# Patient Record
Sex: MALE | Race: WHITE | NOT HISPANIC OR LATINO | Employment: OTHER | URBAN - METROPOLITAN AREA
[De-identification: names, ages, dates, MRNs, and addresses within clinical notes are randomized per-mention and may not be internally consistent; named-entity substitution may affect disease eponyms.]

---

## 2017-01-03 ENCOUNTER — OFFICE VISIT (OUTPATIENT)
Dept: INTERNAL MEDICINE | Age: 67
End: 2017-01-03

## 2017-01-03 VITALS
HEART RATE: 62 BPM | TEMPERATURE: 98.1 F | WEIGHT: 186 LBS | HEIGHT: 72 IN | BODY MASS INDEX: 25.19 KG/M2 | DIASTOLIC BLOOD PRESSURE: 74 MMHG | SYSTOLIC BLOOD PRESSURE: 142 MMHG | OXYGEN SATURATION: 98 %

## 2017-01-03 DIAGNOSIS — I10 ESSENTIAL HYPERTENSION: ICD-10-CM

## 2017-01-03 PROCEDURE — 99213 OFFICE O/P EST LOW 20 MIN: CPT | Performed by: NURSE PRACTITIONER

## 2017-01-03 RX ORDER — AMLODIPINE AND VALSARTAN 5; 160 MG/1; MG/1
1 TABLET ORAL DAILY
Qty: 7 TABLET | Refills: 0 | Status: SHIPPED | OUTPATIENT
Start: 2017-01-03 | End: 2017-01-04 | Stop reason: SDUPTHER

## 2017-01-03 NOTE — MR AVS SNAPSHOT
Chapincito Nguyen   1/3/2017 12:40 PM   Office Visit    Dept Phone:  [phone]   Encounter #:  [id number]    Provider:  MONA Carroll   Department:  [facility]                Your Full Care Plan              Where to Get Your Medications      These medications were sent to [address] [place] - [address] [facility] AT [facility] - [phone]  - [phone] FX  [address] [facility], [place] [address]     Phone:  [phone]     amLODIPine-valsartan 5-160 MG per tablet            Your Updated Medication List          This list is accurate as of: 1/3/17  1:16 PM.  Always use your most recent med list.                amLODIPine-valsartan 5-160 MG per tablet   Commonly known as:  EXFORGE   Take 1 tablet by mouth Daily.       metoprolol succinate XL 50 MG 24 hr tablet   Commonly known as:  TOPROL XL   Take 1 tablet by mouth daily.       sildenafil 100 MG tablet   Commonly known as:  VIAGRA               You Were Diagnosed With        Codes Comments    Essential hypertension     ICD-10-CM: I10  ICD-9-CM: 401.9       Instructions     None    Patient Instructions History      Upcoming Appointments     Visit Type Date Time Department    OFFICE VISIT 1/3/2017 12:40 PM [facility]    OFFICE VISIT 2/13/2017  2:40 PM [facility]      Cr[employer]le Signup     Our records indicate that you have an active [employer] account.    You can view your After Visit Summary by going to [employer] and logging in with your Crackle username and password.  If you don't have a Crackle username and password but a parent or guardian has access to your record, the parent or guardian should login with their own Crackle username and password and access your record to view the After Visit Summary.    If you have questions, you can email [email or web address] or call [phone] to talk to our Crackle staff.  Remember,  "MyChart is NOT to be used for urgent needs.  For medical emergencies, dial 911.               Other Info from Your Visit           Your Appointments     Feb 13, 2017  2:40 PM EST   Office Visit with Ryne Boyce MD   [facility] (--)    [address] [place] [id number] [phone]           Arrive 15 minutes prior to appointment.              Allergies     Other        Reason for Visit     Hypertension Medication refills until est with Dr Boyce      Vital Signs     Blood Pressure Pulse Temperature Height Weight Oxygen Saturation    142/74 62 98.1 °F (36.7 °C) 72\" (182.9 cm) 186 lb (84.4 kg) 98%    Body Mass Index Smoking Status                25.23 kg/m2 Never Smoker          Problems and Diagnoses Noted     High blood pressure        "

## 2017-01-03 NOTE — PROGRESS NOTES
Chapincito Nguyen / 66 y.o. / male  01/03/2017      CC:  Main reason(s) for today's visit: Hypertension (Medication refills until est with Dr Boyce)      HPI:   Patient states that he was a former patient of Dr. Ramey here to establish care with Dr. Boyce on 02/13/2017. States that he needs a refill of his amlodipine until his mail in pharmacy can refill them. He currently has about three pills left. States that his B/P has remained stable with medication. States that he has not had any C/P, denies any chest pressure, no C/O H/A, heart palpitations, visual disturbances or dizziness. States that he has a H/O of aortic coarctation at age 36. States that he has been doing well since that time and sees his cardiologist once a year.      The following portions of the patient's history were reviewed and updated as appropriate: past medical history and past surgical history.    ASSESSMENT & PLAN:    Problem List Items Addressed This Visit        Cardiovascular and Mediastinum    Hypertension    Relevant Medications    metoprolol succinate XL (TOPROL XL) 50 MG 24 hr tablet    amLODIPine-valsartan (EXFORGE) 5-160 MG per tablet        No orders of the defined types were placed in this encounter.      · Summary/Discussion:     Hypertension: Patient with hypertension will be establishing care with Dr. Boyce on 2/13/20/17.  States that he had recently switched from commercial insurance to Medicare.  Patient also reports that his blood pressure has been stable with his medication.  Patient is here to discuss possible medication refills for amlodipine.  States that he currently has about 3 pills left, and will be receiving a 90 day supply from his mail order pharmacy that is afraid he will run out prior to receiving the medication.  I reviewed patient's labs as well as previous progress note and medication.  Patient will be given a refill of the medication.  Patient has no complaints and no abnormalities noted upon physical  examination at this time.  Patient was advised to continue to monitor his blood pressure and to contact the office with any chest pain, chest pressure, shortness of air, heart palpitations, dizziness or visual disturbances.  Otherwise patient was advised to return to the office for follow-up visit with Dr. Ryne Boyce as previously scheduled.    Follow-up: No Follow-up on file.     Future Appointments  Date Time Provider Department Center   2/13/2017 2:40 PM Ryne Boyce MD [facility] None     ____________________________________________________________________    REVIEW OF SYSTEMS    Review of Systems   Constitutional: Negative for activity change, appetite change, chills, diaphoresis, fatigue and fever.   HENT: Negative for congestion, dental problem, ear discharge, facial swelling, hearing loss, mouth sores, sore throat, trouble swallowing and voice change.    Eyes: Negative.    Respiratory: Negative for apnea, cough, choking, chest tightness, shortness of breath, wheezing and stridor.    Cardiovascular: Negative for chest pain, palpitations and leg swelling.   Gastrointestinal: Negative for abdominal distention, abdominal pain, anal bleeding, blood in stool, constipation, diarrhea, nausea, rectal pain and vomiting.   Endocrine: Negative for cold intolerance and heat intolerance.   Genitourinary: Negative for decreased urine volume, difficulty urinating, dysuria, enuresis, flank pain, frequency, genital sores, hematuria and urgency.   Musculoskeletal: Negative for arthralgias, back pain, gait problem, joint swelling, myalgias, neck pain and neck stiffness.   Skin: Negative for color change, pallor, rash and wound.   Allergic/Immunologic: Negative for environmental allergies, food allergies and immunocompromised state.   Neurological: Positive for dizziness (States it only lasts 15 seconds and has nausea and H/A. States that it occurs about once a month.  States that after he  has them he lies down and it  "resolves. ). Negative for tremors, seizures, syncope, facial asymmetry, speech difficulty, weakness, light-headedness, numbness and headaches.   Hematological: Negative for adenopathy. Does not bruise/bleed easily.   Psychiatric/Behavioral: Negative for agitation, confusion, decreased concentration, self-injury, sleep disturbance and suicidal ideas. The patient is not nervous/anxious and is not hyperactive.    All other systems reviewed and are negative.    Other: As noted per HPI    VITALS    Visit Vitals   • /74   • Pulse 62   • Temp 98.1 °F (36.7 °C)   • Ht 72\" (182.9 cm)   • Wt 186 lb (84.4 kg)   • SpO2 98%   • BMI 25.23 kg/m2     BP Readings from Last 3 Encounters:   01/03/17 142/74   04/21/16 144/76   09/22/15 130/70     Wt Readings from Last 3 Encounters:   01/03/17 186 lb (84.4 kg)   04/21/16 187 lb (84.8 kg)   09/22/15 181 lb (82.1 kg)      Body mass index is 25.23 kg/(m^2).    PHYSICAL EXAMINATION    Physical Exam   Constitutional: He is oriented to person, place, and time. He appears well-developed and well-nourished.   HENT:   Head: Normocephalic.   Eyes: Conjunctivae are normal. Pupils are equal, round, and reactive to light.   Neck: Normal range of motion.   Cardiovascular: Normal rate and regular rhythm.    Pulmonary/Chest: Effort normal and breath sounds normal.   Musculoskeletal: Normal range of motion.   Neurological: He is alert and oriented to person, place, and time.   Skin: Skin is warm and dry.   Psychiatric: He has a normal mood and affect. His behavior is normal.   Vitals reviewed.        REVIEWED DATA:    Labs:   Lab Results   Component Value Date     09/22/2015    K 4.1 09/22/2015    AST 18 09/22/2015    ALT 14 09/22/2015    BUN 15 09/22/2015    CREATININE 0.94 09/22/2015    EGFRIFNONA >60 09/22/2015    EGFRIFAFRI >60 09/22/2015       Lab Results   Component Value Date    GLU 96 09/22/2015       Lab Results   Component Value Date    LDL 85 09/22/2015    HDL 39 (L) 09/22/2015    " TRIG 138 09/22/2015       No results found for: TSH, FREET4     No results found for: WBC, HGB, PLT     Imaging:        Medical Tests:        Summary of old records / correspondence / consultant report:        Request outside records:          ALLERGIES:    Other    MEDICATIONS:  Current Outpatient Prescriptions   Medication Sig Dispense Refill   • amLODIPine-valsartan (EXFORGE) 5-160 MG per tablet Take 1 tablet by mouth Daily. 7 tablet 0   • metoprolol succinate XL (TOPROL XL) 50 MG 24 hr tablet Take 1 tablet by mouth daily. 30 tablet 5   • sildenafil (VIAGRA) 100 MG tablet Take  by mouth As Needed.       No current facility-administered medications for this visit.        [facility]    The following portions of the patient's history were reviewed and updated as appropriate: Allergies / Current Medications / Past Medical History / Surgical History / Social History / Family History    PROBLEM LIST:    Patient Active Problem List   Diagnosis   • Elevated prostate specific antigen (PSA)   • Depression   • Impotence of organic origin   • Hypertension   • Strain of lumbar region   • Mass of neck   • Chest wall mass   • Vertigo       PAST MEDICAL HX:    Past Medical History   Diagnosis Date   • Coarctation of aorta      Original in 1987, had a bleed in 1993 which was repaired and in 2006 where he had stents placed.   • Hypertension        PAST SURGICAL HX:    Past Surgical History   Procedure Laterality Date   • Aorta surgery  1987       SOCIAL HX:    Social History     Social History   • Marital status:      Spouse name: N/A   • Number of children: N/A   • Years of education: N/A     Occupational History   • Retired Speech Therapist      Social History Main Topics   • Smoking status: Never Smoker   • Smokeless tobacco: Never Used   • Alcohol use Yes      Comment: Occasionally   • Drug use: None   • Sexual activity: Not Asked     Other Topics Concern   • None     Social History Narrative       FAMILY HX:    Family  History   Problem Relation Age of Onset   • Breast cancer Mother       at age 40   • Breast cancer Maternal Aunt

## 2017-01-04 DIAGNOSIS — I10 ESSENTIAL HYPERTENSION: ICD-10-CM

## 2017-01-04 RX ORDER — METOPROLOL SUCCINATE 50 MG/1
50 TABLET, EXTENDED RELEASE ORAL DAILY
Qty: 90 TABLET | Refills: 0 | Status: SHIPPED | OUTPATIENT
Start: 2017-01-04 | End: 2017-03-03 | Stop reason: SDUPTHER

## 2017-01-04 RX ORDER — AMLODIPINE AND VALSARTAN 5; 160 MG/1; MG/1
1 TABLET ORAL DAILY
Qty: 90 TABLET | Refills: 0 | Status: SHIPPED | OUTPATIENT
Start: 2017-01-04 | End: 2017-03-03 | Stop reason: SDUPTHER

## 2017-01-08 DIAGNOSIS — I10 ESSENTIAL HYPERTENSION: ICD-10-CM

## 2017-01-09 RX ORDER — AMLODIPINE AND VALSARTAN 5; 160 MG/1; MG/1
TABLET ORAL
Qty: 7 TABLET | Refills: 0 | Status: SHIPPED | OUTPATIENT
Start: 2017-01-09 | End: 2017-02-14 | Stop reason: SDUPTHER

## 2017-02-14 ENCOUNTER — OFFICE VISIT (OUTPATIENT)
Dept: INTERNAL MEDICINE | Age: 67
End: 2017-02-14

## 2017-02-14 VITALS
HEIGHT: 72 IN | SYSTOLIC BLOOD PRESSURE: 126 MMHG | TEMPERATURE: 97.3 F | HEART RATE: 65 BPM | WEIGHT: 184.9 LBS | OXYGEN SATURATION: 94 % | DIASTOLIC BLOOD PRESSURE: 62 MMHG | BODY MASS INDEX: 25.04 KG/M2

## 2017-02-14 DIAGNOSIS — I10 ESSENTIAL HYPERTENSION: Primary | ICD-10-CM

## 2017-02-14 DIAGNOSIS — Z00.00 ROUTINE HEALTH MAINTENANCE: ICD-10-CM

## 2017-02-14 DIAGNOSIS — N52.9 IMPOTENCE OF ORGANIC ORIGIN: ICD-10-CM

## 2017-02-14 DIAGNOSIS — R97.20 ELEVATED PROSTATE SPECIFIC ANTIGEN (PSA): ICD-10-CM

## 2017-02-14 LAB
DEVELOPER EXPIRATION DATE: NORMAL
DEVELOPER LOT NUMBER: NORMAL
EXPIRATION DATE: NORMAL
FECAL OCCULT BLOOD SCREEN, POC: NEGATIVE
Lab: NORMAL
NEGATIVE CONTROL: NEGATIVE
POSITIVE CONTROL: POSITIVE

## 2017-02-14 PROCEDURE — 99214 OFFICE O/P EST MOD 30 MIN: CPT | Performed by: INTERNAL MEDICINE

## 2017-02-14 PROCEDURE — 82274 ASSAY TEST FOR BLOOD FECAL: CPT | Performed by: INTERNAL MEDICINE

## 2017-02-14 RX ORDER — ASPIRIN 81 MG/1
81 TABLET ORAL DAILY
COMMUNITY
End: 2022-12-27

## 2017-02-14 NOTE — PROGRESS NOTES
"Chapincito Nguyen / 66 y.o. / male  02/14/2017    VITALS    Visit Vitals   • /62   • Pulse 65   • Temp 97.3 °F (36.3 °C)   • Ht 72\" (182.9 cm)   • Wt 184 lb 14.4 oz (83.9 kg)   • SpO2 94%   • BMI 25.08 kg/m2     BP Readings from Last 3 Encounters:   02/14/17 126/62   01/03/17 142/74   04/21/16 144/76     Wt Readings from Last 3 Encounters:   02/14/17 184 lb 14.4 oz (83.9 kg)   01/03/17 186 lb (84.4 kg)   04/21/16 187 lb (84.8 kg)      Body mass index is 25.08 kg/(m^2).    CC:  Main reason(s) for today's visit: Establish Care (former robert pt. here to establish care); Hypertension; and Depression      HPI:     Patient presents today to become established in our practice.  His prior physician has closed his practice.    Hypertension: He is compliant with medication (amlodipine valsartan, metoprolol).  He is compliant with a low salt diet.  He is not regular with exercise.  We did discuss the utility and importance of exercise not so much for weight reduction in his particular situation, but for maintaining cardiovascular fitness as well as leg strength.    Impotence: Patient is compliant with medication,.  Patient does use the medication, and it is helpful, however he is concerned because of excessive cost associated with use of medication.  We did discuss the possibility of using a generic substitution an effort to save money.    Patient has a history of mildly elevated PSA which is ranged from a low of 4.2 to a high of 5.7 over the past 2 years.  Last PSA done May 2016.    Health maintenance: Last ophthalmology visit 2015.  Patient is aware of the need to see the ophthalmologist on an every 2-4 year basis.    Dentist: Every 6 months.    ______________________________________________________    ASSESSMENT & PLAN:    1. Essential hypertension    2. Impotence of organic origin    3. Elevated prostate specific antigen (PSA)    4. Routine health maintenance      Orders Placed This Encounter   Procedures   • Lipid " Panel   • Comprehensive Metabolic Panel   • PSA   • Urinalysis With / Microscopic If Indicated   • Hepatitis C Antibody   • POC Occult Blood Stool       Summary/Discussion:     · #1hypertension stable on current medical regimen.  Plan: Same meds, blood pressure check 6 months.  Check CMP, lipid when fasting, follow-up results by mail.  ·   · #2 evidence, recommend patient to investigate whether or not he can obtain Viagra on a generic basis which will save money.  ·   · #3 history of elevated PSA, we'll check PSA when lab is obtained and follow-up results as above.  ·   · #4 routine health maintenance: We will check hepatitis C antibody, urinalysis,      Return in about 6 months (around 8/14/2017) for Blood pressure check.    No future appointments.    ______________________________________________________    REVIEW OF SYSTEMS    Review of Systems   Constitutional: Negative.    HENT: Negative.    Eyes: Negative.    Respiratory: Negative.    Cardiovascular: Negative.    Gastrointestinal: Negative.    Endocrine: Negative.    Genitourinary: Negative.    Musculoskeletal:        Patient occasionally will have discomfort left chest and back along the incision of his two thoracotomy procedures   Skin: Negative.    Allergic/Immunologic: Negative for immunocompromised state.   Neurological: Negative.    Hematological: Negative.    Psychiatric/Behavioral: Negative.             PHYSICAL EXAMINATION    Physical Exam   Constitutional: He is oriented to person, place, and time. He appears well-developed and well-nourished. No distress.   HENT:   Head: Normocephalic and atraumatic.   Right Ear: Tympanic membrane, external ear and ear canal normal.   Left Ear: Tympanic membrane, external ear and ear canal normal.   Mouth/Throat: Uvula is midline, oropharynx is clear and moist and mucous membranes are normal.   Eyes: Conjunctivae and EOM are normal. Pupils are equal, round, and reactive to light.   Neck: Normal range of motion. Neck  supple. No JVD present. Carotid bruit is not present. No thyromegaly present.   Cardiovascular: Normal rate, regular rhythm, normal heart sounds and intact distal pulses.  Exam reveals no gallop and no friction rub.    No murmur heard.  Pulmonary/Chest: Effort normal and breath sounds normal. He has no wheezes. He has no rales.   Abdominal: Soft. Bowel sounds are normal. There is no hepatosplenomegaly. There is no tenderness. Hernia confirmed negative in the right inguinal area and confirmed negative in the left inguinal area.   Genitourinary: Rectum normal, prostate normal and penis normal. Prostate is not tender.   Musculoskeletal: Normal range of motion. He exhibits no edema or deformity.   Lymphadenopathy:     He has no cervical adenopathy.   Neurological: He is alert and oriented to person, place, and time. He has normal strength and normal reflexes. No cranial nerve deficit or sensory deficit. Coordination normal.   Skin: Skin is warm and dry. No rash noted.   Psychiatric: He has a normal mood and affect. His speech is normal and behavior is normal. Judgment and thought content normal. Cognition and memory are normal.   Nursing note and vitals reviewed.      REVIEWED DATA:    Labs:   Lab Results   Component Value Date     09/22/2015    K 4.1 09/22/2015    AST 18 09/22/2015    ALT 14 09/22/2015    BUN 15 09/22/2015    CREATININE 0.94 09/22/2015    EGFRIFNONA >60 09/22/2015    EGFRIFAFRI >60 09/22/2015       Lab Results   Component Value Date    GLU 96 09/22/2015       Lab Results   Component Value Date    LDL 85 09/22/2015    HDL 39 (L) 09/22/2015    TRIG 138 09/22/2015       No results found for: TSH, FREET4     No results found for: WBC, HGB, PLT     Imaging:        Medical Tests:        Summary of old records / correspondence / consultant report:        Request outside records:          ALLERGIES:    Review of patient's allergies indicates no known allergies.    MEDICATIONS:       Outpatient  Medications Prior to Visit   Medication Sig Dispense Refill   • amLODIPine-valsartan (EXFORGE) 5-160 MG per tablet Take 1 tablet by mouth Daily. 90 tablet 0   • metoprolol succinate XL (TOPROL XL) 50 MG 24 hr tablet Take 1 tablet by mouth Daily. 90 tablet 0   • sildenafil (VIAGRA) 100 MG tablet Take  by mouth As Needed.     • amLODIPine-valsartan (EXFORGE) 5-160 MG per tablet TAKE ONE TABLET BY MOUTH DAILY 7 tablet 0     No facility-administered medications prior to visit.        [facility]    The following portions of the patient's history were reviewed and updated as appropriate: Allergies / Current Medications / Past Medical History / Surgical History / Social History / Family History    PROBLEM LIST:    Patient Active Problem List   Diagnosis   • Elevated prostate specific antigen (PSA)   • Depression   • Impotence of organic origin   • Hypertension   • Strain of lumbar region   • Mass of neck   • History of cardiac anomaly   • History of aortic coarctation repair   • Aortic aneurysm   • Routine health maintenance       PAST MEDICAL HX:    Past Medical History   Diagnosis Date   • Coarctation of aorta      Original in , had a bleed in  which was repaired and in  where he had stents placed.   • Hypertension        PAST SURGICAL HX:    Past Surgical History   Procedure Laterality Date   • Aorta surgery         SOCIAL HX:    Social History     Social History   • Marital status:      Spouse name: N/A   • Number of children: 3   • Years of education: N/A     Occupational History   • Retired Speech Therapist      Social History Main Topics   • Smoking status: Never Smoker   • Smokeless tobacco: Never Used   • Alcohol use Yes      Comment: Occasionally   • Drug use: None   • Sexual activity: Not Asked     Other Topics Concern   • None     Social History Narrative       FAMILY HX:    Family History   Problem Relation Age of Onset   • Breast cancer Mother       at age 40   • Breast cancer Maternal  Aunt

## 2017-02-22 DIAGNOSIS — R97.20 ELEVATED PSA: Primary | ICD-10-CM

## 2017-02-22 LAB
ALBUMIN SERPL-MCNC: 4.4 G/DL (ref 3.5–5.2)
ALBUMIN/GLOB SERPL: 1.7 G/DL
ALP SERPL-CCNC: 77 U/L (ref 39–117)
ALT SERPL-CCNC: 15 U/L (ref 1–41)
APPEARANCE UR: CLEAR
AST SERPL-CCNC: 14 U/L (ref 1–40)
BILIRUB SERPL-MCNC: 0.6 MG/DL (ref 0.1–1.2)
BILIRUB UR QL STRIP: NEGATIVE
BUN SERPL-MCNC: 14 MG/DL (ref 8–23)
BUN/CREAT SERPL: 15.2 (ref 7–25)
CALCIUM SERPL-MCNC: 9 MG/DL (ref 8.6–10.5)
CHLORIDE SERPL-SCNC: 102 MMOL/L (ref 98–107)
CHOLEST SERPL-MCNC: 142 MG/DL (ref 0–200)
CO2 SERPL-SCNC: 27.5 MMOL/L (ref 22–29)
COLOR UR: YELLOW
CREAT SERPL-MCNC: 0.92 MG/DL (ref 0.76–1.27)
GLOBULIN SER CALC-MCNC: 2.6 GM/DL
GLUCOSE SERPL-MCNC: 93 MG/DL (ref 65–99)
GLUCOSE UR QL: NEGATIVE
HCV AB S/CO SERPL IA: <0.1 S/CO RATIO (ref 0–0.9)
HDLC SERPL-MCNC: 36 MG/DL (ref 40–60)
HGB UR QL STRIP: NEGATIVE
KETONES UR QL STRIP: NEGATIVE
LDLC SERPL CALC-MCNC: 83 MG/DL (ref 0–100)
LEUKOCYTE ESTERASE UR QL STRIP: NEGATIVE
NITRITE UR QL STRIP: NEGATIVE
PH UR STRIP: 6 [PH] (ref 5–8)
POTASSIUM SERPL-SCNC: 4.3 MMOL/L (ref 3.5–5.2)
PROT SERPL-MCNC: 7 G/DL (ref 6–8.5)
PROT UR QL STRIP: NEGATIVE
PSA SERPL-MCNC: 5.07 NG/ML (ref 0–4)
SODIUM SERPL-SCNC: 143 MMOL/L (ref 136–145)
SP GR UR: 1.02 (ref 1–1.03)
TRIGL SERPL-MCNC: 117 MG/DL (ref 0–150)
UROBILINOGEN UR STRIP-MCNC: NORMAL MG/DL
VLDLC SERPL CALC-MCNC: 23.4 MG/DL (ref 5–40)

## 2017-03-03 DIAGNOSIS — I10 ESSENTIAL HYPERTENSION: ICD-10-CM

## 2017-03-06 ENCOUNTER — TELEPHONE (OUTPATIENT)
Dept: INTERNAL MEDICINE | Age: 67
End: 2017-03-06

## 2017-03-06 RX ORDER — METOPROLOL SUCCINATE 50 MG/1
TABLET, EXTENDED RELEASE ORAL
Qty: 90 TABLET | Refills: 1 | Status: SHIPPED | OUTPATIENT
Start: 2017-03-06 | End: 2017-09-19 | Stop reason: SDUPTHER

## 2017-03-06 RX ORDER — AMLODIPINE AND VALSARTAN 5; 160 MG/1; MG/1
TABLET ORAL
Qty: 90 TABLET | Refills: 1 | Status: SHIPPED | OUTPATIENT
Start: 2017-03-06 | End: 2017-09-19 | Stop reason: SDUPTHER

## 2017-08-14 ENCOUNTER — OFFICE VISIT (OUTPATIENT)
Dept: INTERNAL MEDICINE | Age: 67
End: 2017-08-14

## 2017-08-14 VITALS
OXYGEN SATURATION: 96 % | BODY MASS INDEX: 24.38 KG/M2 | WEIGHT: 180 LBS | DIASTOLIC BLOOD PRESSURE: 60 MMHG | TEMPERATURE: 98.1 F | HEIGHT: 72 IN | SYSTOLIC BLOOD PRESSURE: 116 MMHG | HEART RATE: 60 BPM

## 2017-08-14 DIAGNOSIS — I10 ESSENTIAL HYPERTENSION: Primary | ICD-10-CM

## 2017-08-14 DIAGNOSIS — C61 PROSTATE CANCER (HCC): ICD-10-CM

## 2017-08-14 DIAGNOSIS — Z00.00 ROUTINE HEALTH MAINTENANCE: ICD-10-CM

## 2017-08-14 PROCEDURE — 99213 OFFICE O/P EST LOW 20 MIN: CPT | Performed by: INTERNAL MEDICINE

## 2017-08-14 NOTE — PROGRESS NOTES
"Chapincito Nguyen / 66 y.o. / male  08/14/2017  VITALS    /60  Pulse 60  Temp 98.1 °F (36.7 °C)  Ht 72\" (182.9 cm)  Wt 180 lb (81.6 kg)  SpO2 96%  BMI 24.41 kg/m2  BP Readings from Last 3 Encounters:   08/14/17 116/60   02/14/17 126/62   01/03/17 142/74     Wt Readings from Last 3 Encounters:   08/14/17 180 lb (81.6 kg)   02/14/17 184 lb 14.4 oz (83.9 kg)   01/03/17 186 lb (84.4 kg)      Body mass index is 24.41 kg/(m^2).    CC:  Main reason(s) for today's visit: Hypertension      HPI:     Patient was last seen by me during February of this year which time his blood pressure is well-controlled 126/62.  He presents today for follow-up.  Over this timeframe, his weight has dropped approximately 4 pounds.      Interval history: Because of an elevated PSA noted during that visit in February, patient was referred on to urology, biopsy proved positive for prostate cancer.  Cancer was only noted in one of 12 specimens obtained.  He was treated with radiation through July of this year.  Fortunately he displays no significant side effects of diarrhea, urinary incontinence, or worsening of his underlying ED.  The only major side effect noted was that of fatigue which is gradually improving with time.    Hypertension: He is compliant with medication, dietary salt restriction,.  He is exercising as tolerated because of the fatigue he does not regularly monitor blood pressure the outpatient setting.    Patient Care Team:  Ryne Boyce MD as PCP - General (Internal Medicine)  Chandan Brooke MD as PCP - Claims Attributed    ____________________________________________________________________    ASSESSMENT & PLAN:    Problem List Items Addressed This Visit        Unprioritized    Hypertension - Primary    Relevant Medications    metoprolol succinate XL (TOPROL-XL) 50 MG 24 hr tablet    amLODIPine-valsartan (EXFORGE) 5-160 MG per tablet        No orders of the defined types were placed in this " encounter.      Summary/Discussion:     · Number-one hypertension stable on current medical regimen.  Plan: Same meds, blood pressure check 6 months.  ·   · #2 prostate cancer status post radiation without complication, patient will be monitored by urology with a PSA value later this summer.  ·   · #3 routine health maintenance, schedule annual wellness visit 6 months, patient was advised to inquire about having a shingles vaccine done as well.      No Follow-up on file.    Future Appointments  Date Time Provider Department Center   8/14/2017 12:40 PM MD [facility] None       ______________________________________________________    REVIEW OF SYSTEMS    Review of Systems   Respiratory: Negative for chest tightness and shortness of breath.    Cardiovascular: Negative for chest pain and palpitations.   Neurological: Negative for dizziness, syncope, speech difficulty, weakness, light-headedness and headaches.         PHYSICAL EXAMINATION    Physical Exam   Constitutional: He is oriented to person, place, and time. He appears well-developed and well-nourished. No distress.   Cardiovascular: Normal rate, regular rhythm, normal heart sounds and intact distal pulses.  Exam reveals no gallop and no friction rub.    No murmur heard.  Pulmonary/Chest: Effort normal and breath sounds normal. He has no wheezes. He has no rales.   Musculoskeletal: He exhibits no edema.   Neurological: He is alert and oriented to person, place, and time.   Skin: Skin is warm and dry. No rash noted.   Psychiatric: He has a normal mood and affect. His behavior is normal. Judgment and thought content normal.   Nursing note and vitals reviewed.      REVIEWED DATA:    Labs:   Lab Results   Component Value Date     02/21/2017    K 4.3 02/21/2017    AST 14 02/21/2017    ALT 15 02/21/2017    BUN 14 02/21/2017    CREATININE 0.92 02/21/2017    CREATININE 0.94 09/22/2015    EGFRIFNONA 83 02/21/2017    EGFRIFAFRI 100 02/21/2017           Lab Results   Component Value Date    GLU 93 02/21/2017    GLU 96 09/22/2015       Lab Results   Component Value Date    LDL 83 02/21/2017    LDL 85 09/22/2015    HDL 36 (L) 02/21/2017    TRIG 117 02/21/2017       No results found for: TSH, FREET4     No results found for: WBC, HGB, PLT     Imaging:        Medical Tests:        Summary of old records / correspondence / consultant report:        Request outside records:        No Known Allergies    Current Outpatient Prescriptions on File Prior to Visit   Medication Sig Dispense Refill   • amLODIPine-valsartan (EXFORGE) 5-160 MG per tablet TAKE 1 TABLET EVERY DAY 90 tablet 1   • aspirin 81 MG EC tablet Take 81 mg by mouth Daily.     • metoprolol succinate XL (TOPROL-XL) 50 MG 24 hr tablet TAKE 1 TABLET EVERY DAY 90 tablet 1   • sildenafil (VIAGRA) 100 MG tablet Take  by mouth As Needed.       No current facility-administered medications on file prior to visit.        PFSH:     The following portions of the patient's history were reviewed and updated as appropriate: Allergies / Current Medications / Past Medical History / Surgical History / Social History / Family History    Patient Active Problem List   Diagnosis   • Depression   • Impotence of organic origin   • Hypertension   • Mass of neck   • History of cardiac anomaly   • History of aortic coarctation repair   • Aortic aneurysm   • Routine health maintenance   • Prostate cancer       Past Medical History:   Diagnosis Date   • BPH (benign prostatic hypertrophy)    • Cancer 4/17    completed radiation therapy (43 sessions) on July 25, 2017   • Coarctation of aorta     Original in 1987, had a bleed in 1993 which was repaired and in 2006 where he had stents placed.   • Depression    • Hypertension    • Radiation therapy complication    • Strain of lumbar region        Past Surgical History:   Procedure Laterality Date   • AORTA SURGERY  1987       Social History     Social History   • Marital status:       Spouse name: N/A   • Number of children: 3   • Years of education: N/A     Occupational History   • Retired Speech Therapist      Social History Main Topics   • Smoking status: Never Smoker   • Smokeless tobacco: Never Used   • Alcohol use Yes     1 - 2 Glasses of wine, 1 - 2 Cans of beer per week      Comment: 1-2 per week   • Drug use: No   • Sexual activity: Yes     Partners: Female     Birth control/ protection: Surgical      Comment: vasectomy in      Other Topics Concern   • None     Social History Narrative       Family History   Problem Relation Age of Onset   • Breast cancer Mother       at age 40   • Breast cancer Maternal Aunt          **Leonidas Disclaimer:   Much of this encounter note is an electronic transcription/translation of spoken language to printed text. The electronic translation of spoken language may permit erroneous, or at times, nonsensical words or phrases to be inadvertently transcribed. Although I have reviewed the note for such errors, some may still exist.

## 2017-09-18 ENCOUNTER — TELEPHONE (OUTPATIENT)
Dept: INTERNAL MEDICINE | Age: 67
End: 2017-09-18

## 2017-09-18 DIAGNOSIS — I10 ESSENTIAL HYPERTENSION: ICD-10-CM

## 2017-09-18 RX ORDER — AMLODIPINE AND VALSARTAN 5; 160 MG/1; MG/1
TABLET ORAL
Qty: 90 TABLET | Refills: 1 | Status: CANCELLED | OUTPATIENT
Start: 2017-09-18

## 2017-09-18 RX ORDER — METOPROLOL SUCCINATE 50 MG/1
TABLET, EXTENDED RELEASE ORAL
Qty: 90 TABLET | Refills: 1 | Status: CANCELLED | OUTPATIENT
Start: 2017-09-18

## 2017-09-18 NOTE — TELEPHONE ENCOUNTER
Pt called stating he had spoken with his [facility] mail order pharmacy requesting refill on his Metoprolol Succ XL 50 mg and Amlodipine-valsartan 5-160 mg, 90 day supply, to be sent to our office but he hasn't heard from [facility].  Pt requesting refills.  Pt's # [phone]  Thanks SP

## 2017-09-19 RX ORDER — AMLODIPINE AND VALSARTAN 5; 160 MG/1; MG/1
1 TABLET ORAL DAILY
Qty: 90 TABLET | Refills: 1 | Status: SHIPPED | OUTPATIENT
Start: 2017-09-19 | End: 2018-02-19 | Stop reason: SDUPTHER

## 2017-09-19 RX ORDER — METOPROLOL SUCCINATE 50 MG/1
50 TABLET, EXTENDED RELEASE ORAL DAILY
Qty: 90 TABLET | Refills: 1 | Status: SHIPPED | OUTPATIENT
Start: 2017-09-19 | End: 2018-02-19 | Stop reason: SDUPTHER

## 2017-09-22 ENCOUNTER — TELEPHONE (OUTPATIENT)
Dept: INTERNAL MEDICINE | Age: 67
End: 2017-09-22

## 2018-02-16 ENCOUNTER — OFFICE VISIT (OUTPATIENT)
Dept: INTERNAL MEDICINE | Age: 68
End: 2018-02-16

## 2018-02-16 VITALS
SYSTOLIC BLOOD PRESSURE: 130 MMHG | DIASTOLIC BLOOD PRESSURE: 78 MMHG | BODY MASS INDEX: 24.16 KG/M2 | WEIGHT: 178.4 LBS | HEART RATE: 67 BPM | OXYGEN SATURATION: 98 % | TEMPERATURE: 97.9 F | HEIGHT: 72 IN

## 2018-02-16 DIAGNOSIS — Z23 ENCOUNTER FOR IMMUNIZATION: ICD-10-CM

## 2018-02-16 DIAGNOSIS — Z00.00 MEDICARE ANNUAL WELLNESS VISIT, SUBSEQUENT: Primary | ICD-10-CM

## 2018-02-16 DIAGNOSIS — Z20.828 EXPOSURE TO INFLUENZA: ICD-10-CM

## 2018-02-16 DIAGNOSIS — I10 ESSENTIAL HYPERTENSION: ICD-10-CM

## 2018-02-16 PROCEDURE — G0009 ADMIN PNEUMOCOCCAL VACCINE: HCPCS | Performed by: INTERNAL MEDICINE

## 2018-02-16 PROCEDURE — G0439 PPPS, SUBSEQ VISIT: HCPCS | Performed by: INTERNAL MEDICINE

## 2018-02-16 PROCEDURE — 90670 PCV13 VACCINE IM: CPT | Performed by: INTERNAL MEDICINE

## 2018-02-16 PROCEDURE — 99213 OFFICE O/P EST LOW 20 MIN: CPT | Performed by: INTERNAL MEDICINE

## 2018-02-16 RX ORDER — OSELTAMIVIR PHOSPHATE 75 MG/1
75 CAPSULE ORAL 2 TIMES DAILY
Qty: 10 CAPSULE | Refills: 0 | Status: SHIPPED | OUTPATIENT
Start: 2018-02-16 | End: 2018-02-16 | Stop reason: SDUPTHER

## 2018-02-16 RX ORDER — OSELTAMIVIR PHOSPHATE 75 MG/1
75 CAPSULE ORAL 2 TIMES DAILY
Qty: 10 CAPSULE | Refills: 0 | Status: SHIPPED | OUTPATIENT
Start: 2018-02-16 | End: 2018-08-10

## 2018-02-16 NOTE — PROGRESS NOTES
QUICK REFERENCE INFORMATION:  The ABCs of the Annual Wellness Visit    Subsequent Medicare Wellness Visit    HEALTH RISK ASSESSMENT    1950    Recent Hospitalizations:  No hospitalization(s) within the last year..        Current Medical Providers:  Patient Care Team:  Ryne Boyce MD as PCP - General (Internal Medicine)  Chandan Brooke MD as PCP - Claims Attributed        Smoking Status:  History   Smoking Status   • Never Smoker   Smokeless Tobacco   • Never Used       Alcohol Consumption:  History   Alcohol Use   • Yes   • 1 - 2 Glasses of wine, 1 - 2 Cans of beer per week     Comment: 1-2 per week       Depression Screen:   PHQ-2/PHQ-9 Depression Screening 2/16/2018   Little interest or pleasure in doing things 0   Feeling down, depressed, or hopeless 0   Total Score 0       Health Habits and Functional and Cognitive Screening:  Functional & Cognitive Status 2/16/2018   Do you have difficulty preparing food and eating? No   Do you have difficulty bathing yourself, getting dressed or grooming yourself? No   Do you have difficulty using the toilet? No   Do you have difficulty moving around from place to place? No   Do you have trouble with steps or getting out of a bed or a chair? No   Do you need help using the phone?  No   Are you deaf or do you have serious difficulty hearing?  No   Do you need help with transportation? No   Do you need help shopping? No   Do you need help preparing meals?  No   Do you need help with housework?  No   Do you need help with laundry? No   Do you need help managing money? No   Have you felt unusual stress, anger or loneliness in the last month? Yes   Who do you live with? Spouse   If you need help, do you have trouble finding someone available to you? No   Have you been bothered in the last four weeks by sexual problems? No   Do you have difficulty concentrating, remembering or making decisions? No           Does the patient have evidence of cognitive impairment?  Yes    Aspirin use counseling: Taking ASA appropriately as indicated      Recent Lab Results:  CMP:  Lab Results   Component Value Date    GLU 93 02/21/2017    BUN 14 02/21/2017    CREATININE 0.92 02/21/2017    EGFRIFNONA 83 02/21/2017    EGFRIFAFRI 100 02/21/2017    BCR 15.2 02/21/2017     02/21/2017    K 4.3 02/21/2017    CO2 27.5 02/21/2017    CALCIUM 9.0 02/21/2017    PROTENTOTREF 7.0 02/21/2017    ALBUMIN 4.40 02/21/2017    LABGLOBREF 2.6 02/21/2017    LABIL2 1.7 02/21/2017    BILITOT 0.6 02/21/2017    ALKPHOS 77 02/21/2017    AST 14 02/21/2017    ALT 15 02/21/2017     Lipid Panel:  Lab Results   Component Value Date    TRIG 117 02/21/2017    HDL 36 (L) 02/21/2017    VLDL 23.4 02/21/2017     HbA1c:       Visual Acuity:  No exam data present    Age-appropriate Screening Schedule:  Refer to the list below for future screening recommendations based on patient's age, sex and/or medical conditions. Orders for these recommended tests are listed in the plan section. The patient has been provided with a written plan.    Health Maintenance   Topic Date Due   • ZOSTER VACCINE  04/21/2016   • PNEUMOCOCCAL VACCINES (65+ LOW/MEDIUM RISK) (2 of 2 - PPSV23) 02/14/2018   • COLONOSCOPY  04/21/2024   • TDAP/TD VACCINES (2 - Td) 09/22/2025   • INFLUENZA VACCINE  Addressed        Subjective   History of Present Illness    Chapincito Nguyen is a 67 y.o. male who presents for an Subsequent Wellness Visit.    The following portions of the patient's history were reviewed and updated as appropriate: allergies, current medications, past family history, past medical history, past social history, past surgical history and problem list.    Outpatient Medications Prior to Visit   Medication Sig Dispense Refill   • amLODIPine-valsartan (EXFORGE) 5-160 MG per tablet Take 1 tablet by mouth Daily. 90 tablet 1   • aspirin 81 MG EC tablet Take 81 mg by mouth Daily.     • metoprolol succinate XL (TOPROL-XL) 50 MG 24 hr tablet Take 1 tablet by  "mouth Daily. 90 tablet 1   • sildenafil (VIAGRA) 100 MG tablet Take  by mouth As Needed.       No facility-administered medications prior to visit.        Patient Active Problem List   Diagnosis   • Depression   • Impotence of organic origin   • Hypertension   • Hx of bicuspid aortic valve   • H/O aortic coarctation repair   • Aortic aneurysm, including pseudoaneurysm   • Routine health maintenance   • Prostate cancer       Advance Care Planning:  has an advance directive - a copy has been provided and is in file    Identification of Risk Factors:  Risk factors include: inactivity.    Review of Systems    Compared to one year ago, the patient feels his physical health is the same.  Compared to one year ago, the patient feels his mental health is the same.    Objective     Physical Exam    Vitals:    02/16/18 0949   BP: 130/78   Pulse: 67   Temp: 97.9 °F (36.6 °C)   SpO2: 98%   Weight: 80.9 kg (178 lb 6.4 oz)   Height: 182.9 cm (72.01\")   PainSc: 0-No pain       Body mass index is 24.19 kg/(m^2).  Discussed the patient's BMI with him. BMI is within normal parameters. No follow-up required.    Assessment/Plan   Patient Self-Management and Personalized Health Advice  The patient has been provided with information about: None needed and preventive services including:   · Diabetes screening, see lab orders, Counseling regarding the new shingles vaccine has been provided..    Visit Diagnoses:    ICD-10-CM ICD-9-CM   1. Medicare annual wellness visit, subsequent Z00.00 V70.0   2. Essential hypertension I10 401.9   3. Encounter for immunization Z23 V03.89   4. Exposure to influenza Z20.828 V01.79       Orders Placed This Encounter   Procedures   • Pneumococcal Conjugate Vaccine 13-Valent All       Outpatient Encounter Prescriptions as of 2/16/2018   Medication Sig Dispense Refill   • amLODIPine-valsartan (EXFORGE) 5-160 MG per tablet Take 1 tablet by mouth Daily. 90 tablet 1   • aspirin 81 MG EC tablet Take 81 mg by mouth " Daily.     • metoprolol succinate XL (TOPROL-XL) 50 MG 24 hr tablet Take 1 tablet by mouth Daily. 90 tablet 1   • oseltamivir (TAMIFLU) 75 MG capsule Take 1 capsule by mouth 2 (Two) Times a Day. 10 capsule 0   • sildenafil (VIAGRA) 100 MG tablet Take  by mouth As Needed.       No facility-administered encounter medications on file as of 2/16/2018.        Reviewed use of high risk medication in the elderly: yes  Reviewed for potential of harmful drug interactions in the elderly: yes    Follow Up:  1 YEAR    An After Visit Summary and PPPS with all of these plans were given to the patient.

## 2018-02-16 NOTE — PATIENT INSTRUCTIONS
Medicare Wellness  Personal Prevention Plan of Service     Date of Office Visit:  2018  Encounter Provider:  Ryne Boyce MD  Place of Service:  [facility]  Patient Name: Chapincito Nguyen  :  1950    As part of the Medicare Wellness portion of your visit today, we are providing you with this personalized preventive plan of services (PPPS). This plan is based upon recommendations of the [place] Preventive Services Task Force (USPSTF) and the Advisory Committee on Immunization Practices (ACIP).    This lists the preventive care services that should be considered, and provides dates of when you are due. Items listed as completed are up-to-date and do not require any further intervention.    Health Maintenance   Topic Date Due   • ZOSTER VACCINE  2016   • PNEUMOCOCCAL VACCINES (65+ LOW/MEDIUM RISK) (2 of 2 - PPSV23) 2018   • MEDICARE ANNUAL WELLNESS  2019   • COLONOSCOPY  2024   • TDAP/TD VACCINES (2 - Td) 2025   • HEPATITIS C SCREENING  Completed   • INFLUENZA VACCINE  Addressed       Orders Placed This Encounter   Procedures   • Pneumococcal Conjugate Vaccine 13-Valent All   • Comprehensive Metabolic Panel   • Lipid Panel   • CBC & Differential     Order Specific Question:   Manual Differential     Answer:   No       No Follow-up on file.

## 2018-02-19 ENCOUNTER — TELEPHONE (OUTPATIENT)
Dept: INTERNAL MEDICINE | Age: 68
End: 2018-02-19

## 2018-02-19 DIAGNOSIS — I10 ESSENTIAL HYPERTENSION: ICD-10-CM

## 2018-02-19 RX ORDER — AMLODIPINE AND VALSARTAN 5; 160 MG/1; MG/1
1 TABLET ORAL DAILY
Qty: 90 TABLET | Refills: 1 | Status: SHIPPED | OUTPATIENT
Start: 2018-02-19 | End: 2018-08-10 | Stop reason: SDUPTHER

## 2018-02-19 RX ORDER — METOPROLOL SUCCINATE 50 MG/1
50 TABLET, EXTENDED RELEASE ORAL DAILY
Qty: 90 TABLET | Refills: 1 | Status: SHIPPED | OUTPATIENT
Start: 2018-02-19 | End: 2018-08-10 | Stop reason: SDUPTHER

## 2018-02-19 NOTE — TELEPHONE ENCOUNTER
[facility] - [place], [place] - [address] - [phone] [facility] [phone] FX    amLODIPine-valsartan (EXFORGE) 5-160 MG per tablet  metoprolol succinate XL (TOPROL-XL) 50 MG 24 hr tablet     These meds were to be sent to the above pharmacy on Friday and they were not received at the pharmacy. Pt needs these sent.    Thank you

## 2018-02-20 LAB
ALBUMIN SERPL-MCNC: 4.4 G/DL (ref 3.5–5.2)
ALBUMIN/GLOB SERPL: 1.6 G/DL
ALP SERPL-CCNC: 98 U/L (ref 39–117)
ALT SERPL-CCNC: 12 U/L (ref 1–41)
AST SERPL-CCNC: 15 U/L (ref 1–40)
BASOPHILS # BLD AUTO: 0.02 10*3/MM3 (ref 0–0.2)
BASOPHILS NFR BLD AUTO: 0.4 % (ref 0–1.5)
BILIRUB SERPL-MCNC: 0.7 MG/DL (ref 0.1–1.2)
BUN SERPL-MCNC: 14 MG/DL (ref 8–23)
BUN/CREAT SERPL: 15.2 (ref 7–25)
CALCIUM SERPL-MCNC: 9.3 MG/DL (ref 8.6–10.5)
CHLORIDE SERPL-SCNC: 101 MMOL/L (ref 98–107)
CHOLEST SERPL-MCNC: 145 MG/DL (ref 0–200)
CO2 SERPL-SCNC: 28.4 MMOL/L (ref 22–29)
CREAT SERPL-MCNC: 0.92 MG/DL (ref 0.76–1.27)
EOSINOPHIL # BLD AUTO: 0.13 10*3/MM3 (ref 0–0.7)
EOSINOPHIL NFR BLD AUTO: 2.4 % (ref 0.3–6.2)
ERYTHROCYTE [DISTWIDTH] IN BLOOD BY AUTOMATED COUNT: 12.8 % (ref 11.5–14.5)
GFR SERPLBLD CREATININE-BSD FMLA CKD-EPI: 100 ML/MIN/1.73
GFR SERPLBLD CREATININE-BSD FMLA CKD-EPI: 82 ML/MIN/1.73
GLOBULIN SER CALC-MCNC: 2.7 GM/DL
GLUCOSE SERPL-MCNC: 92 MG/DL (ref 65–99)
HCT VFR BLD AUTO: 45.2 % (ref 40.4–52.2)
HDLC SERPL-MCNC: 37 MG/DL (ref 40–60)
HGB BLD-MCNC: 14.8 G/DL (ref 13.7–17.6)
IMM GRANULOCYTES # BLD: 0 10*3/MM3 (ref 0–0.03)
IMM GRANULOCYTES NFR BLD: 0 % (ref 0–0.5)
LDLC SERPL CALC-MCNC: 85 MG/DL (ref 0–100)
LYMPHOCYTES # BLD AUTO: 0.78 10*3/MM3 (ref 0.9–4.8)
LYMPHOCYTES NFR BLD AUTO: 14.3 % (ref 19.6–45.3)
MCH RBC QN AUTO: 30.7 PG (ref 27–32.7)
MCHC RBC AUTO-ENTMCNC: 32.7 G/DL (ref 32.6–36.4)
MCV RBC AUTO: 93.8 FL (ref 79.8–96.2)
MONOCYTES # BLD AUTO: 0.64 10*3/MM3 (ref 0.2–1.2)
MONOCYTES NFR BLD AUTO: 11.7 % (ref 5–12)
NEUTROPHILS # BLD AUTO: 3.89 10*3/MM3 (ref 1.9–8.1)
NEUTROPHILS NFR BLD AUTO: 71.2 % (ref 42.7–76)
PLATELET # BLD AUTO: 240 10*3/MM3 (ref 140–500)
POTASSIUM SERPL-SCNC: 4.6 MMOL/L (ref 3.5–5.2)
PROT SERPL-MCNC: 7.1 G/DL (ref 6–8.5)
RBC # BLD AUTO: 4.82 10*6/MM3 (ref 4.6–6)
SODIUM SERPL-SCNC: 141 MMOL/L (ref 136–145)
TRIGL SERPL-MCNC: 115 MG/DL (ref 0–150)
VLDLC SERPL CALC-MCNC: 23 MG/DL (ref 5–40)
WBC # BLD AUTO: 5.46 10*3/MM3 (ref 4.5–10.7)

## 2018-02-20 NOTE — PROGRESS NOTES
All of the enclosed lab results are acceptable. None of  the the minimally abnormal values  are of any clinical significance.  Exercise would improve the HDL. Please repeat the labs in one year.

## 2018-08-10 ENCOUNTER — OFFICE VISIT (OUTPATIENT)
Dept: INTERNAL MEDICINE | Age: 68
End: 2018-08-10

## 2018-08-10 VITALS
WEIGHT: 178.4 LBS | HEIGHT: 72 IN | BODY MASS INDEX: 24.16 KG/M2 | SYSTOLIC BLOOD PRESSURE: 106 MMHG | TEMPERATURE: 97.9 F | DIASTOLIC BLOOD PRESSURE: 68 MMHG | OXYGEN SATURATION: 98 % | HEART RATE: 56 BPM

## 2018-08-10 DIAGNOSIS — I10 ESSENTIAL HYPERTENSION: ICD-10-CM

## 2018-08-10 DIAGNOSIS — G44.319 ACUTE POST-TRAUMATIC HEADACHE, NOT INTRACTABLE: ICD-10-CM

## 2018-08-10 DIAGNOSIS — I10 ESSENTIAL HYPERTENSION: Primary | ICD-10-CM

## 2018-08-10 PROCEDURE — 99213 OFFICE O/P EST LOW 20 MIN: CPT | Performed by: INTERNAL MEDICINE

## 2018-08-10 RX ORDER — AMLODIPINE AND VALSARTAN 5; 160 MG/1; MG/1
1 TABLET ORAL DAILY
Qty: 90 TABLET | Refills: 1 | Status: SHIPPED | OUTPATIENT
Start: 2018-08-10 | End: 2019-01-16 | Stop reason: SDUPTHER

## 2018-08-10 RX ORDER — METOPROLOL SUCCINATE 50 MG/1
25 TABLET, EXTENDED RELEASE ORAL DAILY
Qty: 90 TABLET | Refills: 1 | COMMUNITY
Start: 2018-08-10 | End: 2018-08-10 | Stop reason: SDUPTHER

## 2018-08-10 RX ORDER — METOPROLOL SUCCINATE 25 MG/1
25 TABLET, EXTENDED RELEASE ORAL DAILY
Qty: 90 TABLET | Refills: 1 | Status: SHIPPED | OUTPATIENT
Start: 2018-08-10 | End: 2019-01-16 | Stop reason: SDUPTHER

## 2018-08-10 NOTE — PROGRESS NOTES
"[facility] INTERNAL MEDICINE  MD Chapincito MURPHY Patrick / 67 y.o. / male  08/10/2018      ASSESSMENT & PLAN:    Problem List Items Addressed This Visit        Unprioritized    Hypertension - Primary    Relevant Medications    amLODIPine-valsartan (EXFORGE) 5-160 MG per tablet    metoprolol succinate XL (TOPROL-XL) 50 MG 24 hr tablet      Other Visit Diagnoses     Acute post-traumatic headache, not intractable        Relevant Medications    metoprolol succinate XL (TOPROL-XL) 50 MG 24 hr tablet        No orders of the defined types were placed in this encounter.      Summary/Discussion:    Number-one hypertension overly strict control on current medication.  Recommend the patient reduce the metoprolol dosage to 25 mg per day, follow-up blood pressure check 6 months.    #2 headache posttraumatic resolved at this time.  There is no localizing finding on physical exam.  I do not believe that a CT scan is indicated at this time.  Reassurance, patient will let me know if symptoms were to recur      Return in about 6 months (around 2/10/2019) for Blood pressure check.    ____________________________________________________________________    VITALS    Visit Vitals  /68   Pulse 56   Temp 97.9 °F (36.6 °C)   Ht 182.9 cm (72.01\")   Wt 80.9 kg (178 lb 6.4 oz)   SpO2 98%   BMI 24.19 kg/m²       BP Readings from Last 3 Encounters:   08/10/18 106/68   02/16/18 130/78   08/14/17 116/60     Wt Readings from Last 3 Encounters:   08/10/18 80.9 kg (178 lb 6.4 oz)   02/16/18 80.9 kg (178 lb 6.4 oz)   08/14/17 81.6 kg (180 lb)      Body mass index is 24.19 kg/m².    CC:  Main reason(s) for today's visit: Hypertension and Headache      HPI:     Patient presents this afternoon for follow-up of hypertension.  When last seen by us, blood pressure is well-controlled 130/78.  Today he is compliant with medication, dietary salt restriction, regular physical activity, but he does not monitor blood pressure the outpatient setting.  " There are no medication side effects noted.    Approximately 6 weeks ago patient was visiting friends walked into a doorway in the dark and instructed is top of his skull on the door jam.  This caused hyperextension of his neck and subsequent headaches in the same area as the injury over the ensuing 7-10 days.  He has had no headache now in the past 3 weeks.  This time his neck is fine without pain or radicular symptomatology.    Laboratory review February 19, 2018, CMP lipid CBC normal see below for specific values.  Patient sees urology every 3 months to check prostate and PSA.     Patient Care Team:  Ryne Boyce MD as PCP - General (Internal Medicine)  Chandan Brooke MD as PCP - Claims Attributed  ____________________________________________________________________    REVIEW OF SYSTEMS    Review of Systems   Respiratory: Negative for chest tightness and shortness of breath.    Cardiovascular: Negative for chest pain and palpitations.   Musculoskeletal: Negative for neck pain.   Neurological: Positive for headaches. Negative for dizziness, syncope, speech difficulty, weakness and light-headedness.        See history of present illness         PHYSICAL EXAMINATION    Physical Exam   Constitutional: He is oriented to person, place, and time. He appears well-developed and well-nourished. No distress.   Eyes: Pupils are equal, round, and reactive to light. EOM are normal.   Fundoscopic exam:       The right eye shows no papilledema.        The left eye shows no papilledema.   Cardiovascular: Normal rate, regular rhythm, normal heart sounds and intact distal pulses.  Exam reveals no gallop and no friction rub.    No murmur heard.  Pulmonary/Chest: Effort normal and breath sounds normal. He has no wheezes. He has no rales.   Musculoskeletal: He exhibits no edema.   Neurological: He is alert and oriented to person, place, and time. He has normal reflexes. He displays no tremor. No cranial nerve deficit or sensory  deficit. Gait normal.   Skin: Skin is warm and dry. No rash noted.   Psychiatric: He has a normal mood and affect. His behavior is normal. Judgment and thought content normal.   Nursing note and vitals reviewed.        REVIEWED DATA:    Labs:     Lab Results   Component Value Date     02/19/2018    K 4.6 02/19/2018    AST 15 02/19/2018    ALT 12 02/19/2018    BUN 14 02/19/2018    CREATININE 0.92 02/19/2018    CREATININE 0.92 02/21/2017    CREATININE 0.94 09/22/2015    EGFRIFNONA 82 02/19/2018    EGFRIFAFRI 100 02/19/2018       No results found for: HGBA1C, GLUCOSE, MICROALBUR    Lab Results   Component Value Date    LDL 85 02/19/2018    LDL 83 02/21/2017    LDL 85 09/22/2015    HDL 37 (L) 02/19/2018    TRIG 115 02/19/2018       No results found for: TSH, FREET4    Lab Results   Component Value Date    WBC 5.46 02/19/2018    HGB 14.8 02/19/2018     02/19/2018       Lab Results   Component Value Date    PSA 5.070 (H) 02/21/2017         Imaging:         Medical Tests:         Summary of old records / correspondence / consultant report:         Request outside records:         ALLERGIES  No Known Allergies     MEDICATIONS  Current Outpatient Prescriptions   Medication Sig Dispense Refill   • amLODIPine-valsartan (EXFORGE) 5-160 MG per tablet Take 1 tablet by mouth Daily. 90 tablet 1   • aspirin 81 MG EC tablet Take 81 mg by mouth Daily.     • metoprolol succinate XL (TOPROL-XL) 50 MG 24 hr tablet Take 0.5 tablets by mouth Daily. 90 tablet 1   • sildenafil (VIAGRA) 100 MG tablet Take  by mouth As Needed.       No current facility-administered medications for this visit.        PFSH:     The following portions of the patient's history were reviewed and updated as appropriate: Allergies / Current Medications / Past Medical History / Surgical History / Social History / Family History    PROBLEM LIST   Patient Active Problem List   Diagnosis   • Depression   • Impotence of organic origin   • Hypertension   • Hx  of bicuspid aortic valve   • H/O aortic coarctation repair   • Aortic aneurysm, including pseudoaneurysm (CMS/HCC)   • Routine health maintenance   • Prostate cancer (CMS/HCC)       PAST MEDICAL HISTORY  Past Medical History:   Diagnosis Date   • BPH (benign prostatic hypertrophy)    • Cancer (CMS/HCC)     completed radiation therapy (43 sessions) on 2017   • Coarctation of aorta     Original in , had a bleed in  which was repaired and in  where he had stents placed.   • Depression    • Hypertension    • Radiation therapy complication    • Strain of lumbar region        SURGICAL HISTORY  Past Surgical History:   Procedure Laterality Date   • AORTA SURGERY         SOCIAL HISTORY  Social History     Social History   • Marital status:    • Number of children: 3     Occupational History   • Retired Speech Therapist      Social History Main Topics   • Smoking status: Never Smoker   • Smokeless tobacco: Never Used   • Alcohol use Yes     1 - 2 Glasses of wine, 1 - 2 Cans of beer per week      Comment: 1-2 per week   • Drug use: No   • Sexual activity: Yes     Partners: Female     Birth control/ protection: Surgical      Comment: vasectomy in      Other Topics Concern   • Not on file       FAMILY HISTORY  Family History   Problem Relation Age of Onset   • Breast cancer Mother          at age 40   • Cancer Mother    • Mental illness Mother    • Miscarriages / Stillbirths Mother    • Breast cancer Maternal Aunt    • Cancer Maternal Uncle    • Cancer Maternal Aunt    • Cancer Maternal Grandfather    • Cancer Maternal Uncle        Health Maintenance Due   Topic   • ZOSTER VACCINE (1 of 2)   • INFLUENZA VACCINE        Overdue health maintenance interventions  reviewed with patient.    Dictated utilizing Dragon voice recognition software  Answers for HPI/ROS submitted by the patient on 8/3/2018   Hypertension  Chronicity: chronic  Onset: more than 1 year ago  Condition status:  controlled  anxiety: No  blurred vision: No  malaise/fatigue: No  orthopnea: No  peripheral edema: No  PND: No  sweats: No  Agents associated with hypertension: no associated agents  CAD risks: no known risk factors  Compliance problems: no compliance problems

## 2019-01-16 ENCOUNTER — OFFICE VISIT (OUTPATIENT)
Dept: INTERNAL MEDICINE | Age: 69
End: 2019-01-16

## 2019-01-16 VITALS
DIASTOLIC BLOOD PRESSURE: 60 MMHG | BODY MASS INDEX: 25.11 KG/M2 | TEMPERATURE: 97.6 F | SYSTOLIC BLOOD PRESSURE: 125 MMHG | OXYGEN SATURATION: 97 % | WEIGHT: 185.4 LBS | HEART RATE: 67 BPM | HEIGHT: 72 IN

## 2019-01-16 DIAGNOSIS — I10 ESSENTIAL HYPERTENSION: Primary | ICD-10-CM

## 2019-01-16 DIAGNOSIS — I10 ESSENTIAL HYPERTENSION: ICD-10-CM

## 2019-01-16 DIAGNOSIS — Z23 ENCOUNTER FOR IMMUNIZATION: ICD-10-CM

## 2019-01-16 PROCEDURE — 90674 CCIIV4 VAC NO PRSV 0.5 ML IM: CPT | Performed by: INTERNAL MEDICINE

## 2019-01-16 PROCEDURE — 99213 OFFICE O/P EST LOW 20 MIN: CPT | Performed by: INTERNAL MEDICINE

## 2019-01-16 PROCEDURE — G0008 ADMIN INFLUENZA VIRUS VAC: HCPCS | Performed by: INTERNAL MEDICINE

## 2019-01-16 RX ORDER — METOPROLOL SUCCINATE 25 MG/1
25 TABLET, EXTENDED RELEASE ORAL DAILY
Qty: 90 TABLET | Refills: 0 | Status: SHIPPED | OUTPATIENT
Start: 2019-01-16 | End: 2019-09-03 | Stop reason: SDUPTHER

## 2019-01-16 RX ORDER — AMLODIPINE AND VALSARTAN 5; 160 MG/1; MG/1
1 TABLET ORAL DAILY
Qty: 90 TABLET | Refills: 0 | Status: SHIPPED | OUTPATIENT
Start: 2019-01-16 | End: 2019-06-06 | Stop reason: SDUPTHER

## 2019-01-16 NOTE — PROGRESS NOTES
"[facility] [place] [facility]  MD Chapincito MURPHY Patrick / 68 y.o. / male  01/16/2019      ASSESSMENT & PLAN:    Problem List Items Addressed This Visit        Unprioritized    Hypertension - Primary    Relevant Medications    metoprolol succinate XL (TOPROL-XL) 25 MG 24 hr tablet    amLODIPine-valsartan (EXFORGE) 5-160 MG per tablet    Other Relevant Orders    Comprehensive Metabolic Panel    Lipid Panel      Other Visit Diagnoses     Encounter for immunization        Relevant Orders    Flucelvax Quad=>4Years (PFS)        Orders Placed This Encounter   Procedures   • Flucelvax Quad=>4Years (PFS)   • Comprehensive Metabolic Panel   • Lipid Panel       Summary/Discussion:    Number-one hypertension stable on meds continue same, blood pressure check 6 months with Dr. Roberson.  Check CMP and lipid when fasting follow-up results online.    #2 immunization update, flu shot today.      Return in about 6 months (around 7/16/2019), or Dr Roberson, for Blood pressure check.    ____________________________________________________________________    VITALS    Visit Vitals  /60   Pulse 67   Temp 97.6 °F (36.4 °C)   Ht 182.9 cm (72.01\")   Wt 84.1 kg (185 lb 6.4 oz)   SpO2 97%   BMI 25.14 kg/m²       BP Readings from Last 3 Encounters:   01/16/19 125/60   08/10/18 106/68   02/16/18 130/78     Wt Readings from Last 3 Encounters:   01/16/19 84.1 kg (185 lb 6.4 oz)   08/10/18 80.9 kg (178 lb 6.4 oz)   02/16/18 80.9 kg (178 lb 6.4 oz)      Body mass index is 25.14 kg/m².    CC:  Main reason(s) for today's visit: Hypertension      HPI:     Patient presents this morning for follow-up of hypertension.  When last seen by me, blood pressure was low at 106/68.  Because of that, we reduce the metoprolol dosage by 50%.  At this time he is compliant with medication, exercise, dietary salt restriction, and does occasionally monitor blood pressure the outpatient setting.  There are no medication side effects noted.  The orthostasis that was " noted has now since resolved.    Patient requests flu shot today.      Laboratory review February 2018 CMP, lipid, CBC, normal.  Patient has been seen by urology within the past week for follow-up.  PSA was normal 1.34    Patient Care Team:  Ryne Boyce MD as PCP - General (Internal Medicine)  ____________________________________________________________________    REVIEW OF SYSTEMS    Review of Systems   Respiratory: Negative for chest tightness and shortness of breath.    Cardiovascular: Negative for chest pain and palpitations.   Neurological: Negative for dizziness, syncope, speech difficulty, weakness, light-headedness and headaches.         PHYSICAL EXAMINATION    Physical Exam   Constitutional: He is oriented to person, place, and time. He appears well-developed and well-nourished. No distress.   Cardiovascular: Normal rate, regular rhythm, normal heart sounds and intact distal pulses. Exam reveals no gallop and no friction rub.   No murmur heard.  Pulmonary/Chest: Effort normal and breath sounds normal. He has no wheezes. He has no rales.   Musculoskeletal: He exhibits no edema.   Neurological: He is alert and oriented to person, place, and time.   Skin: Skin is warm and dry. No rash noted.   Psychiatric: He has a normal mood and affect. His behavior is normal. Judgment and thought content normal.   Nursing note and vitals reviewed.        REVIEWED DATA:    Labs:     Lab Results   Component Value Date     02/19/2018    K 4.6 02/19/2018    AST 15 02/19/2018    ALT 12 02/19/2018    BUN 14 02/19/2018    CREATININE 0.92 02/19/2018    CREATININE 0.92 02/21/2017    CREATININE 0.94 09/22/2015    EGFRIFNONA 82 02/19/2018    EGFRIFAFRI 100 02/19/2018       No results found for: HGBA1C, GLUCOSE, MICROALBUR    Lab Results   Component Value Date    LDL 85 02/19/2018    LDL 83 02/21/2017    LDL 85 09/22/2015    HDL 37 (L) 02/19/2018    TRIG 115 02/19/2018       No results found for: TSH, FREET4    Lab Results    Component Value Date    WBC 5.46 02/19/2018    HGB 14.8 02/19/2018     02/19/2018       Lab Results   Component Value Date    PSA 5.070 (H) 02/21/2017         Imaging:         Medical Tests:         Summary of old records / correspondence / consultant report:         Request outside records:         ALLERGIES  No Known Allergies     MEDICATIONS  Current Outpatient Medications   Medication Sig Dispense Refill   • amLODIPine-valsartan (EXFORGE) 5-160 MG per tablet Take 1 tablet by mouth Daily. 90 tablet 1   • aspirin 81 MG EC tablet Take 81 mg by mouth Daily.     • metoprolol succinate XL (TOPROL-XL) 25 MG 24 hr tablet Take 1 tablet by mouth Daily. 90 tablet 1   • sildenafil (VIAGRA) 100 MG tablet Take  by mouth As Needed.       No current facility-administered medications for this visit.        PFSH:     The following portions of the patient's history were reviewed and updated as appropriate: Allergies / Current Medications / Past Medical History / Surgical History / Social History / Family History    PROBLEM LIST   Patient Active Problem List   Diagnosis   • Depression   • Impotence of organic origin   • Hypertension   • Hx of bicuspid aortic valve   • H/O aortic coarctation repair   • Aortic aneurysm, including pseudoaneurysm (CMS/HCC)   • Routine health maintenance   • Prostate cancer (CMS/HCC)       PAST MEDICAL HISTORY  Past Medical History:   Diagnosis Date   • BPH (benign prostatic hypertrophy)    • Cancer (CMS/HCC) 4/17    completed radiation therapy (43 sessions) on July 25, 2017   • Coarctation of aorta     Original in 1987, had a bleed in 1993 which was repaired and in 2006 where he had stents placed.   • Depression    • Hypertension    • Radiation therapy complication    • Strain of lumbar region        SURGICAL HISTORY  Past Surgical History:   Procedure Laterality Date   • AORTA SURGERY  1987       SOCIAL HISTORY  Social History     Socioeconomic History   • Marital status:      Spouse  name: Not on file   • Number of children: 3   • Years of education: Not on file   • Highest education level: Not on file   Occupational History   • Occupation: Retired Speech Therapist   Tobacco Use   • Smoking status: Never Smoker   • Smokeless tobacco: Never Used   Substance and Sexual Activity   • Alcohol use: Yes     Types: 1 - 2 Glasses of wine, 1 - 2 Cans of beer per week     Comment: 1-2 per week   • Drug use: No   • Sexual activity: Yes     Partners: Female     Birth control/protection: Surgical     Comment: vasectomy in        FAMILY HISTORY  Family History   Problem Relation Age of Onset   • Breast cancer Mother          at age 40   • Cancer Mother    • Mental illness Mother    • Miscarriages / Stillbirths Mother    • Breast cancer Maternal Aunt    • Cancer Maternal Uncle    • Cancer Maternal Aunt    • Cancer Maternal Grandfather    • Cancer Maternal Uncle        Health Maintenance Due   Topic   • ZOSTER VACCINE (1 of 2)       Overdue health maintenance interventions  reviewed with patient.    Dictated utilizing Dragon voice recognition software

## 2019-01-22 LAB
ALBUMIN SERPL-MCNC: 4.4 G/DL (ref 3.5–5.2)
ALBUMIN/GLOB SERPL: 1.4 G/DL
ALP SERPL-CCNC: 75 U/L (ref 39–117)
ALT SERPL-CCNC: 16 U/L (ref 1–41)
AST SERPL-CCNC: 20 U/L (ref 1–40)
BILIRUB SERPL-MCNC: 0.5 MG/DL (ref 0.1–1.2)
BUN SERPL-MCNC: 13 MG/DL (ref 8–23)
BUN/CREAT SERPL: 14.4 (ref 7–25)
CALCIUM SERPL-MCNC: 9.4 MG/DL (ref 8.6–10.5)
CHLORIDE SERPL-SCNC: 104 MMOL/L (ref 98–107)
CHOLEST SERPL-MCNC: 160 MG/DL (ref 0–200)
CO2 SERPL-SCNC: 26.9 MMOL/L (ref 22–29)
CREAT SERPL-MCNC: 0.9 MG/DL (ref 0.76–1.27)
GLOBULIN SER CALC-MCNC: 3.1 GM/DL
GLUCOSE SERPL-MCNC: 93 MG/DL (ref 65–99)
HDLC SERPL-MCNC: 42 MG/DL (ref 40–60)
LDLC SERPL CALC-MCNC: 92 MG/DL (ref 0–100)
POTASSIUM SERPL-SCNC: 4.8 MMOL/L (ref 3.5–5.2)
PROT SERPL-MCNC: 7.5 G/DL (ref 6–8.5)
SODIUM SERPL-SCNC: 144 MMOL/L (ref 136–145)
TRIGL SERPL-MCNC: 128 MG/DL (ref 0–150)
VLDLC SERPL CALC-MCNC: 25.6 MG/DL (ref 5–40)

## 2019-06-06 DIAGNOSIS — I10 ESSENTIAL HYPERTENSION: ICD-10-CM

## 2019-06-06 RX ORDER — AMLODIPINE AND VALSARTAN 5; 160 MG/1; MG/1
1 TABLET ORAL DAILY
Qty: 90 TABLET | Refills: 0 | Status: SHIPPED | OUTPATIENT
Start: 2019-06-06 | End: 2019-08-29 | Stop reason: SDUPTHER

## 2019-07-16 ENCOUNTER — OFFICE VISIT (OUTPATIENT)
Dept: INTERNAL MEDICINE | Age: 69
End: 2019-07-16

## 2019-07-16 VITALS
BODY MASS INDEX: 24.79 KG/M2 | SYSTOLIC BLOOD PRESSURE: 128 MMHG | TEMPERATURE: 97.7 F | OXYGEN SATURATION: 98 % | HEIGHT: 72 IN | DIASTOLIC BLOOD PRESSURE: 64 MMHG | WEIGHT: 183 LBS | HEART RATE: 54 BPM | RESPIRATION RATE: 13 BRPM

## 2019-07-16 DIAGNOSIS — Z76.89 ESTABLISHING CARE WITH NEW DOCTOR, ENCOUNTER FOR: ICD-10-CM

## 2019-07-16 DIAGNOSIS — I10 ESSENTIAL HYPERTENSION: Primary | ICD-10-CM

## 2019-07-16 PROCEDURE — 99214 OFFICE O/P EST MOD 30 MIN: CPT | Performed by: INTERNAL MEDICINE

## 2019-07-16 NOTE — PROGRESS NOTES
Chapincito Nguyen is a 68 y.o. male who presents with   Chief Complaint   Patient presents with   • Hypertension     Amlodipine/valsartan 5/160; metoprolol succinate XL 25 mg   • Establishing care with new physician     Former patient of Dr. Boyce   .    68-year-old male.  Presents for blood pressure checkup and general checkup.  Former patient of Dr. Boyce.  No problems or complaints on today's visit.  Last wellness visit February 16, 2018.  Labs were reviewed over the last 3 sets of labs going back into 2017 and all numbers appear to be stable and with minor variations within ranges of acceptability.      Hypertension   This is a chronic problem. The current episode started more than 1 year ago. The problem is controlled. Current antihypertension treatment includes calcium channel blockers, angiotensin blockers and beta blockers. The current treatment provides moderate improvement. There are no compliance problems.         The following portions of the patient's history were reviewed and updated as appropriate: allergies, current medications, past medical history and problem list.    Review of Systems   Constitutional: Negative.    HENT: Negative.    Eyes: Negative.    Respiratory: Negative.    Cardiovascular: Negative.    Genitourinary: Negative.    Musculoskeletal: Negative.    Skin: Negative.    Neurological: Negative.    Psychiatric/Behavioral: Negative.        Objective   Physical Exam   Constitutional: He is oriented to person, place, and time. He appears well-developed and well-nourished. No distress.   HENT:   Head: Normocephalic and atraumatic.   Eyes: Conjunctivae and EOM are normal. Pupils are equal, round, and reactive to light.   Neck: Normal range of motion. Neck supple. No thyromegaly present.   Neck exam negative.  Carotid auscultation normal-no bruits heard.   Cardiovascular: Normal rate, regular rhythm and intact distal pulses. Exam reveals no gallop and no friction rub.   Murmur heard.  2/6  systolic murmur right upper sternal border.   Pulmonary/Chest: Effort normal and breath sounds normal. No respiratory distress. He has no wheezes. He has no rales. He exhibits no tenderness.   Neurological: He is alert and oriented to person, place, and time.   Psychiatric: He has a normal mood and affect. His behavior is normal. Judgment and thought content normal.   Nursing note and vitals reviewed.      Assessment/Plan   Chapincito was seen today for hypertension and establishing care with new physician.    Diagnoses and all orders for this visit:    Essential hypertension    Establishing care with new doctor, encounter for        Plan: We will get him set up with a wellness visit with lab updates for sometime in February.  After that we will see him at 6-month intervals for blood pressure check.  Assuming his labs are normal the next time and unchanged from previous labs then we will likely just do annual lab updates with the wellness visits.  Continue all current treatment as prescribed for now.

## 2019-08-29 DIAGNOSIS — I10 ESSENTIAL HYPERTENSION: ICD-10-CM

## 2019-08-30 RX ORDER — AMLODIPINE AND VALSARTAN 5; 160 MG/1; MG/1
TABLET ORAL
Qty: 90 TABLET | Refills: 1 | Status: SHIPPED | OUTPATIENT
Start: 2019-08-30 | End: 2019-11-06 | Stop reason: SDUPTHER

## 2019-09-03 ENCOUNTER — TELEPHONE (OUTPATIENT)
Dept: INTERNAL MEDICINE | Age: 69
End: 2019-09-03

## 2019-09-03 DIAGNOSIS — I10 ESSENTIAL HYPERTENSION: ICD-10-CM

## 2019-09-03 RX ORDER — METOPROLOL SUCCINATE 25 MG/1
25 TABLET, EXTENDED RELEASE ORAL DAILY
Qty: 90 TABLET | Refills: 0 | Status: SHIPPED | OUTPATIENT
Start: 2019-09-03 | End: 2019-11-06 | Stop reason: SDUPTHER

## 2019-11-06 ENCOUNTER — TELEPHONE (OUTPATIENT)
Dept: INTERNAL MEDICINE | Age: 69
End: 2019-11-06

## 2019-11-06 DIAGNOSIS — I10 ESSENTIAL HYPERTENSION: ICD-10-CM

## 2019-11-06 RX ORDER — METOPROLOL SUCCINATE 25 MG/1
25 TABLET, EXTENDED RELEASE ORAL DAILY
Qty: 90 TABLET | Refills: 1 | Status: SHIPPED | OUTPATIENT
Start: 2019-11-06 | End: 2020-06-13

## 2019-11-06 RX ORDER — AMLODIPINE AND VALSARTAN 5; 160 MG/1; MG/1
1 TABLET ORAL DAILY
Qty: 90 TABLET | Refills: 1 | Status: SHIPPED | OUTPATIENT
Start: 2019-11-06 | End: 2020-06-08

## 2020-02-18 ENCOUNTER — OFFICE VISIT (OUTPATIENT)
Dept: INTERNAL MEDICINE | Age: 70
End: 2020-02-18

## 2020-02-18 VITALS
RESPIRATION RATE: 13 BRPM | HEIGHT: 72 IN | DIASTOLIC BLOOD PRESSURE: 80 MMHG | BODY MASS INDEX: 24.38 KG/M2 | OXYGEN SATURATION: 98 % | TEMPERATURE: 98.5 F | WEIGHT: 180 LBS | HEART RATE: 67 BPM | SYSTOLIC BLOOD PRESSURE: 120 MMHG

## 2020-02-18 DIAGNOSIS — I71.20 THORACIC AORTIC ANEURYSM WITHOUT RUPTURE (HCC): ICD-10-CM

## 2020-02-18 DIAGNOSIS — Z00.00 ROUTINE HEALTH MAINTENANCE: ICD-10-CM

## 2020-02-18 DIAGNOSIS — C61 PROSTATE CANCER (HCC): ICD-10-CM

## 2020-02-18 DIAGNOSIS — Z00.00 MEDICARE ANNUAL WELLNESS VISIT, SUBSEQUENT: Primary | ICD-10-CM

## 2020-02-18 DIAGNOSIS — I10 ESSENTIAL HYPERTENSION: ICD-10-CM

## 2020-02-18 LAB
ALBUMIN SERPL-MCNC: 4.2 G/DL (ref 3.5–5.2)
ALBUMIN/GLOB SERPL: 1.6 G/DL
ALP SERPL-CCNC: 74 U/L (ref 39–117)
ALT SERPL-CCNC: 10 U/L (ref 1–41)
APPEARANCE UR: CLEAR
AST SERPL-CCNC: 11 U/L (ref 1–40)
BASOPHILS # BLD AUTO: 0.03 10*3/MM3 (ref 0–0.2)
BASOPHILS NFR BLD AUTO: 0.7 % (ref 0–1.5)
BILIRUB SERPL-MCNC: 0.5 MG/DL (ref 0.2–1.2)
BILIRUB UR QL STRIP: NEGATIVE
BUN SERPL-MCNC: 9 MG/DL (ref 8–23)
BUN/CREAT SERPL: 9.8 (ref 7–25)
CALCIUM SERPL-MCNC: 9 MG/DL (ref 8.6–10.5)
CHLORIDE SERPL-SCNC: 103 MMOL/L (ref 98–107)
CHOLEST SERPL-MCNC: 132 MG/DL (ref 0–200)
CO2 SERPL-SCNC: 26.9 MMOL/L (ref 22–29)
COLOR UR: ABNORMAL
CREAT SERPL-MCNC: 0.92 MG/DL (ref 0.76–1.27)
EOSINOPHIL # BLD AUTO: 0.08 10*3/MM3 (ref 0–0.4)
EOSINOPHIL NFR BLD AUTO: 1.9 % (ref 0.3–6.2)
ERYTHROCYTE [DISTWIDTH] IN BLOOD BY AUTOMATED COUNT: 12.7 % (ref 12.3–15.4)
GLOBULIN SER CALC-MCNC: 2.6 GM/DL
GLUCOSE SERPL-MCNC: 105 MG/DL (ref 65–99)
GLUCOSE UR QL: NEGATIVE
HCT VFR BLD AUTO: 44 % (ref 37.5–51)
HDLC SERPL-MCNC: 37 MG/DL (ref 40–60)
HGB BLD-MCNC: 14.8 G/DL (ref 13–17.7)
HGB UR QL STRIP: NEGATIVE
IMM GRANULOCYTES # BLD AUTO: 0 10*3/MM3 (ref 0–0.05)
IMM GRANULOCYTES NFR BLD AUTO: 0 % (ref 0–0.5)
KETONES UR QL STRIP: ABNORMAL
LDLC SERPL CALC-MCNC: 69 MG/DL (ref 0–100)
LEUKOCYTE ESTERASE UR QL STRIP: NEGATIVE
LYMPHOCYTES # BLD AUTO: 0.88 10*3/MM3 (ref 0.7–3.1)
LYMPHOCYTES NFR BLD AUTO: 21.4 % (ref 19.6–45.3)
MCH RBC QN AUTO: 30.1 PG (ref 26.6–33)
MCHC RBC AUTO-ENTMCNC: 33.6 G/DL (ref 31.5–35.7)
MCV RBC AUTO: 89.6 FL (ref 79–97)
MONOCYTES # BLD AUTO: 0.43 10*3/MM3 (ref 0.1–0.9)
MONOCYTES NFR BLD AUTO: 10.5 % (ref 5–12)
NEUTROPHILS # BLD AUTO: 2.69 10*3/MM3 (ref 1.7–7)
NEUTROPHILS NFR BLD AUTO: 65.5 % (ref 42.7–76)
NITRITE UR QL STRIP: NEGATIVE
NRBC BLD AUTO-RTO: 0 /100 WBC (ref 0–0.2)
PH UR STRIP: 5.5 [PH] (ref 5–8)
PLATELET # BLD AUTO: 248 10*3/MM3 (ref 140–450)
POTASSIUM SERPL-SCNC: 4.2 MMOL/L (ref 3.5–5.2)
PROT SERPL-MCNC: 6.8 G/DL (ref 6–8.5)
PROT UR QL STRIP: ABNORMAL
RBC # BLD AUTO: 4.91 10*6/MM3 (ref 4.14–5.8)
SODIUM SERPL-SCNC: 142 MMOL/L (ref 136–145)
SP GR UR: 1.03 (ref 1–1.03)
T4 FREE SERPL-MCNC: 1.09 NG/DL (ref 0.93–1.7)
TRIGL SERPL-MCNC: 130 MG/DL (ref 0–150)
TSH SERPL DL<=0.005 MIU/L-ACNC: 1.54 UIU/ML (ref 0.27–4.2)
UROBILINOGEN UR STRIP-MCNC: ABNORMAL MG/DL
VLDLC SERPL CALC-MCNC: 26 MG/DL
WBC # BLD AUTO: 4.11 10*3/MM3 (ref 3.4–10.8)

## 2020-02-18 PROCEDURE — 99214 OFFICE O/P EST MOD 30 MIN: CPT | Performed by: INTERNAL MEDICINE

## 2020-02-18 PROCEDURE — G0439 PPPS, SUBSEQ VISIT: HCPCS | Performed by: INTERNAL MEDICINE

## 2020-02-18 NOTE — PROGRESS NOTES
"  Chapincito Nguyen is a 69 y.o. male who presents with   Chief Complaint   Patient presents with   • Medicare Wellness-subsequent     Former patient of Dr. Boyce.  Last Medicare wellness visit February 2016 2018   • Hypertension     Exforge (amlodipine/valsartan) 5-160 mg daily; metoprolol succinate XL 25 mg daily   • History of prostate cancer     Radiation therapy in the past.  No prostatectomy.  Being seen every 6 months by urology for PSAs and prostate checks   • Thoracic aortic aneurysm     Being seen by cardiology-Dr. Valles.  Being followed with ultrasound examinations periodically.  The patient says the aneurysm is \"getting close to 5 cm in size\"   .    This patient is a 69-year-old male, former patient of Dr. Boyce, who presents for a subsequent Medicare wellness visit.  In addition to the wellness visit the physical issues as outlined above were dealt with separately and individually.  Also recent labs were reviewed with the patient and all medications were reviewed as well.  Total amount of time spent with this patient dealing with these physical issues over and above the amount of time spent performing the Medicare wellness visit amounted to 27 minutes.  Better than 50% of the office visit time was spent in direct face-to-face consultation with the patient regarding these issues.      Hypertension   This is a chronic problem. The current episode started more than 1 year ago. The problem is controlled. Current antihypertension treatment includes calcium channel blockers and angiotensin blockers. The current treatment provides moderate improvement. There are no compliance problems.       Prostate cancer/thoracic aortic aneurysm: History is as outlined above with consultant care as noted.    /80   Pulse 67   Temp 98.5 °F (36.9 °C)   Resp 13   Ht 182.9 cm (72.01\")   Wt 81.6 kg (180 lb)   SpO2 98%   BMI 24.41 kg/m²     The following portions of the patient's history were reviewed and updated as " appropriate: allergies, current medications, past family history, past medical history, past social history, past surgical history and problem list.    Review of Systems   Constitutional: Negative.    HENT: Negative.    Eyes: Negative.    Respiratory: Negative.    Cardiovascular: Negative.    Genitourinary: Negative.    Musculoskeletal: Positive for arthralgias.        Diffuse joint pains-neck, shoulders, hands   Skin: Negative.    Neurological: Negative.    Psychiatric/Behavioral: Negative.        Objective   Physical Exam   Constitutional: He is oriented to person, place, and time. He appears well-developed and well-nourished. No distress.   HENT:   Head: Normocephalic and atraumatic.   Right Ear: External ear normal.   Left Ear: External ear normal.   Nose: Nose normal.   Mouth/Throat: Oropharynx is clear and moist. No oropharyngeal exudate.   Eyes: Pupils are equal, round, and reactive to light. Conjunctivae and EOM are normal. Right eye exhibits no discharge. Left eye exhibits no discharge. No scleral icterus.   Neck: Normal range of motion. Neck supple. No JVD present. No tracheal deviation present. No thyromegaly present.   Neck exam negative.  Carotid auscultation normal-no bruits heard.   Cardiovascular: Normal rate, regular rhythm, normal heart sounds and intact distal pulses. Exam reveals no gallop and no friction rub.   No murmur heard.  Pulmonary/Chest: Effort normal and breath sounds normal. No respiratory distress. He has no wheezes. He has no rales. He exhibits no tenderness.   Abdominal: Soft. Bowel sounds are normal. He exhibits no distension and no mass. There is no tenderness. No hernia.   Genitourinary:   Genitourinary Comments: History of prostate cancer.  Has had radiation treatment but no prostatectomy.  Genitourinary care being rendered by urology who sees him every 6 months for digital rectal exam, PSA etc.   Musculoskeletal: Normal range of motion. He exhibits no edema, tenderness or  deformity.   Subjective complaints as noted above-neck, shoulders, hands.  He takes Aleve as needed.  Not interested in any x-rays at this point in time   Lymphadenopathy:     He has no cervical adenopathy.   Neurological: He is alert and oriented to person, place, and time. He has normal reflexes. He displays normal reflexes. No cranial nerve deficit. He exhibits normal muscle tone. Coordination normal.   Skin: Skin is warm and dry. No rash noted. He is not diaphoretic. No erythema. No pallor.   Psychiatric: He has a normal mood and affect. His behavior is normal. Judgment and thought content normal.   Nursing note and vitals reviewed.      Assessment/Plan   Chapincito was seen today for medicare wellness-subsequent, hypertension, history of prostate cancer and thoracic aortic aneurysm.    Diagnoses and all orders for this visit:    Medicare annual wellness visit, subsequent    Essential hypertension  -     CBC & Differential  -     Comprehensive Metabolic Panel  -     Lipid Panel  -     TSH+Free T4  -     Urinalysis With Microscopic If Indicated (No Culture) - Urine, Clean Catch    Prostate cancer (CMS/HCC)    Thoracic aortic aneurysm without rupture (CMS/HCC)  -     Lipid Panel    Routine health maintenance  -     CBC & Differential  -     Comprehensive Metabolic Panel  -     Lipid Panel  -     TSH+Free T4  -     Urinalysis With Microscopic If Indicated (No Culture) - Urine, Clean Catch      Plan: Labs as above for the physical issues as outlined.  Continue all current treatment as prescribed.  Monitor blood pressure at home trying to keep it at or below 130/80 consistently.    Continue urology care and cardiology care as noted from the history.    Arthralgias as noted from the history.  Patient declines any x-rays at this point but will use Aleve as needed.    Assuming today's labs are acceptable we will plan on seeing him annually for his subsequent Medicare wellness visit/lab update visit.    Included in today's  exam was face to face time spent in preventative counseling regarding weight loss, daily exercise, and minimizing sweets and carbohydrates.  Additionally, health maintenance needs were discussed and reviewed as well.

## 2020-02-18 NOTE — PROGRESS NOTES
"The ABCs of the Annual Wellness Visit  Subsequent Medicare Wellness Visit    Chief Complaint   Patient presents with   • Medicare Wellness-subsequent     Former patient of Dr. Boyce.  Last Medicare wellness visit February 2016 2018   • Hypertension     Exforge (amlodipine/valsartan) 5-160 mg daily; metoprolol succinate XL 25 mg daily   • History of prostate cancer     Radiation therapy in the past.  No prostatectomy.  Being seen every 6 months by urology for PSAs and prostate checks   • Thoracic aortic aneurysm     Being seen by cardiology-Dr. Valles.  Being followed with ultrasound examinations periodically.  The patient says the aneurysm is \"getting close to 5 cm in size\"       Subjective   History of Present Illness:  Chapincito Nguyen is a 69 y.o. male who presents for a Subsequent Medicare Wellness Visit.    HEALTH RISK ASSESSMENT    Recent Hospitalizations:  No hospitalization(s) within the last year.    Current Medical Providers:  Patient Care Team:  Trevor Roberson MD as PCP - General (Internal Medicine)  Chandan Brooke MD as PCP - Claims Attributed   Dr Valles-cardiology    Smoking Status:  Social History     Tobacco Use   Smoking Status Never Smoker   Smokeless Tobacco Never Used       Alcohol Consumption:  Social History     Substance and Sexual Activity   Alcohol Use Yes   • Types: 1 - 2 Glasses of wine, 1 - 2 Cans of beer per week    Comment: 1-2 per week       Depression Screen:   PHQ-2/PHQ-9 Depression Screening 2/18/2020   Little interest or pleasure in doing things 0   Feeling down, depressed, or hopeless 1   Total Score 1       Fall Risk Screen:  STEADI Fall Risk Assessment has not been completed.    Health Habits and Functional and Cognitive Screening:  Functional & Cognitive Status 2/18/2020   Do you have difficulty preparing food and eating? No   Do you have difficulty bathing yourself, getting dressed or grooming yourself? No   Do you have difficulty using the toilet? No   Do you have difficulty " moving around from place to place? No   Do you have trouble with steps or getting out of a bed or a chair? No   Do you need help using the phone?  No   Are you deaf or do you have serious difficulty hearing?  No   Do you need help with transportation? No   Do you need help shopping? No   Do you need help preparing meals?  No   Do you need help with housework?  No   Do you need help with laundry? No   Do you need help taking your medications? No   Do you need help managing money? No   Do you ever drive or ride in a car without wearing a seat belt? No   Have you felt unusual stress, anger or loneliness in the last month? -   Who do you live with? -   If you need help, do you have trouble finding someone available to you? -   Have you been bothered in the last four weeks by sexual problems? -   Do you have difficulty concentrating, remembering or making decisions? -         Does the patient have evidence of cognitive impairment? No    Asprin use counseling:Taking ASA appropriately as indicated    Age-appropriate Screening Schedule:  Refer to the list below for future screening recommendations based on patient's age, sex and/or medical conditions. Orders for these recommended tests are listed in the plan section. The patient has been provided with a written plan.    Health Maintenance   Topic Date Due   • ZOSTER VACCINE (1 of 2) 02/18/2021 (Originally 9/17/2001)   • COLONOSCOPY  04/21/2024   • TDAP/TD VACCINES (2 - Td) 09/22/2025   • INFLUENZA VACCINE  Completed          The following portions of the patient's history were reviewed and updated as appropriate: allergies, current medications, past family history, past medical history, past social history, past surgical history and problem list.    Outpatient Medications Prior to Visit   Medication Sig Dispense Refill   • amLODIPine-valsartan (EXFORGE) 5-160 MG per tablet Take 1 tablet by mouth Daily. 90 tablet 1   • aspirin 81 MG EC tablet Take 81 mg by mouth Daily.     •  "metoprolol succinate XL (TOPROL-XL) 25 MG 24 hr tablet Take 1 tablet by mouth Daily. 90 tablet 1   • sildenafil (VIAGRA) 100 MG tablet Take 100 mg by mouth Daily.       No facility-administered medications prior to visit.        Patient Active Problem List   Diagnosis   • Depression   • Impotence of organic origin   • Hypertension   • Hx of bicuspid aortic valve   • H/O aortic coarctation repair   • Aortic aneurysm, including pseudoaneurysm (CMS/HCC)   • Routine health maintenance   • Prostate cancer (CMS/HCC)   • Rhus dermatitis       Advanced Care Planning:  ACP discussion was held with the patient during this visit. Patient has an advance directive, copy requested.    Review of Systems    Compared to one year ago, the patient feels his physical health is the same.  Compared to one year ago, the patient feels his mental health is the same.    Reviewed chart for potential of high risk medication in the elderly: yes  Reviewed chart for potential of harmful drug interactions in the elderly:yes    Objective         Vitals:    02/18/20 0717 02/18/20 0747   BP:  120/80   Pulse: 67 67   Resp:  13   Temp: 98.5 °F (36.9 °C)    SpO2: 98%    Weight: 81.6 kg (180 lb)    Height: 182.9 cm (72.01\")    PainSc:   1    PainLoc: Hip        Body mass index is 24.41 kg/m².  Discussed the patient's BMI with him. The BMI is in the acceptable range.    Physical Exam: See dictated note as above          Assessment/Plan   Medicare Risks and Personalized Health Plan  CMS Preventative Services Quick Reference  Advance Directive Discussion  Cardiovascular risk  Colon Cancer Screening  Inactivity/Sedentary  Prostate Cancer Screening     The above risks/problems have been discussed with the patient.  Pertinent information has been shared with the patient in the After Visit Summary.  Follow up plans and orders are seen below in the Assessment/Plan Section.    Diagnoses and all orders for this visit:    1. Medicare annual wellness visit, " subsequent (Primary)    2. Essential hypertension  -     CBC & Differential  -     Comprehensive Metabolic Panel  -     Lipid Panel  -     TSH+Free T4  -     Urinalysis With Microscopic If Indicated (No Culture) - Urine, Clean Catch    3. Prostate cancer (CMS/HCC)    4. Thoracic aortic aneurysm without rupture (CMS/HCC)  -     Lipid Panel    5. Routine health maintenance  -     CBC & Differential  -     Comprehensive Metabolic Panel  -     Lipid Panel  -     TSH+Free T4  -     Urinalysis With Microscopic If Indicated (No Culture) - Urine, Clean Catch      Follow Up:  Return in about 1 year (around 2/18/2021) for Wellness visit, Lab updates.     Continue all treatment as prescribed.    Assuming today's labs are acceptable we will see him in 1 year for a wellness visit/lab update visit as noted.     Included in today's exam was face to face time spent in preventative counseling regarding weight loss, daily exercise, and minimizing sweets and carbohydrates.  Additionally, health maintenance needs were discussed and reviewed as well.    An After Visit Summary and PPPS were given to the patient.

## 2020-02-19 NOTE — PROGRESS NOTES
All labs recently tested on your Medicare Wellness Visit, including the Blood Count, Blood Sugar, Liver/Kidney tests,  Lipid levels, Thyroid tests and Urinalysis are within normal and acceptable ranges. Continue all medications as currently prescribed. A followup Wellness Visit with lab updates is advised for one year as discussed.

## 2020-06-08 DIAGNOSIS — I10 ESSENTIAL HYPERTENSION: ICD-10-CM

## 2020-06-08 RX ORDER — AMLODIPINE AND VALSARTAN 5; 160 MG/1; MG/1
TABLET ORAL
Qty: 90 TABLET | Refills: 1 | Status: SHIPPED | OUTPATIENT
Start: 2020-06-08 | End: 2020-12-08 | Stop reason: SDUPTHER

## 2020-06-13 DIAGNOSIS — I10 ESSENTIAL HYPERTENSION: ICD-10-CM

## 2020-06-13 RX ORDER — METOPROLOL SUCCINATE 25 MG/1
TABLET, EXTENDED RELEASE ORAL
Qty: 90 TABLET | Refills: 1 | Status: SHIPPED | OUTPATIENT
Start: 2020-06-13 | End: 2020-12-08 | Stop reason: SDUPTHER

## 2020-08-14 ENCOUNTER — OFFICE VISIT (OUTPATIENT)
Dept: INTERNAL MEDICINE | Age: 70
End: 2020-08-14

## 2020-08-14 VITALS
OXYGEN SATURATION: 98 % | RESPIRATION RATE: 13 BRPM | HEIGHT: 72 IN | BODY MASS INDEX: 25.38 KG/M2 | SYSTOLIC BLOOD PRESSURE: 120 MMHG | HEART RATE: 77 BPM | DIASTOLIC BLOOD PRESSURE: 78 MMHG | WEIGHT: 187.4 LBS | TEMPERATURE: 98 F

## 2020-08-14 DIAGNOSIS — I10 ESSENTIAL HYPERTENSION: ICD-10-CM

## 2020-08-14 DIAGNOSIS — R07.89 CHEST WALL PAIN: Primary | ICD-10-CM

## 2020-08-14 PROCEDURE — 99214 OFFICE O/P EST MOD 30 MIN: CPT | Performed by: INTERNAL MEDICINE

## 2020-08-14 RX ORDER — METHOCARBAMOL 500 MG/1
500 TABLET, FILM COATED ORAL 4 TIMES DAILY
COMMUNITY
End: 2021-02-24

## 2020-08-14 RX ORDER — METHYLPREDNISOLONE 4 MG/1
4 TABLET ORAL DAILY
COMMUNITY
End: 2021-02-24
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
## 2020-12-04 DIAGNOSIS — I10 ESSENTIAL HYPERTENSION: ICD-10-CM

## 2020-12-04 RX ORDER — METOPROLOL SUCCINATE 25 MG/1
25 TABLET, EXTENDED RELEASE ORAL DAILY
Qty: 90 TABLET | Refills: 3 | Status: CANCELLED | OUTPATIENT
Start: 2020-12-04

## 2020-12-04 RX ORDER — AMLODIPINE AND VALSARTAN 5; 160 MG/1; MG/1
1 TABLET ORAL DAILY
Qty: 90 TABLET | Refills: 3 | Status: CANCELLED | OUTPATIENT
Start: 2020-12-04

## 2020-12-08 DIAGNOSIS — I10 ESSENTIAL HYPERTENSION: ICD-10-CM

## 2020-12-08 RX ORDER — METOPROLOL SUCCINATE 25 MG/1
25 TABLET, EXTENDED RELEASE ORAL DAILY
Qty: 90 TABLET | Refills: 1 | Status: SHIPPED | OUTPATIENT
Start: 2020-12-08 | End: 2020-12-15 | Stop reason: SDUPTHER

## 2020-12-08 RX ORDER — AMLODIPINE AND VALSARTAN 5; 160 MG/1; MG/1
1 TABLET ORAL DAILY
Qty: 90 TABLET | Refills: 1 | Status: SHIPPED | OUTPATIENT
Start: 2020-12-08 | End: 2020-12-15 | Stop reason: SDUPTHER

## 2020-12-15 DIAGNOSIS — I10 ESSENTIAL HYPERTENSION: ICD-10-CM

## 2020-12-17 RX ORDER — AMLODIPINE AND VALSARTAN 5; 160 MG/1; MG/1
1 TABLET ORAL DAILY
Qty: 90 TABLET | Refills: 1 | Status: SHIPPED | OUTPATIENT
Start: 2020-12-17 | End: 2021-09-07

## 2020-12-17 RX ORDER — METOPROLOL SUCCINATE 25 MG/1
25 TABLET, EXTENDED RELEASE ORAL DAILY
Qty: 90 TABLET | Refills: 1 | Status: SHIPPED | OUTPATIENT
Start: 2020-12-17 | End: 2021-09-07

## 2021-02-24 ENCOUNTER — OFFICE VISIT (OUTPATIENT)
Dept: INTERNAL MEDICINE | Age: 71
End: 2021-02-24

## 2021-02-24 VITALS
WEIGHT: 192 LBS | HEIGHT: 72 IN | OXYGEN SATURATION: 98 % | BODY MASS INDEX: 26.01 KG/M2 | TEMPERATURE: 97.3 F | HEART RATE: 68 BPM | SYSTOLIC BLOOD PRESSURE: 150 MMHG | DIASTOLIC BLOOD PRESSURE: 82 MMHG

## 2021-02-24 DIAGNOSIS — Z86.79 STATUS POST THORACIC AORTIC ANEURYSM REPAIR: ICD-10-CM

## 2021-02-24 DIAGNOSIS — Z87.74 H/O AORTIC COARCTATION REPAIR: Chronic | ICD-10-CM

## 2021-02-24 DIAGNOSIS — C61 PROSTATE CANCER (HCC): ICD-10-CM

## 2021-02-24 DIAGNOSIS — I10 ESSENTIAL HYPERTENSION: Primary | Chronic | ICD-10-CM

## 2021-02-24 DIAGNOSIS — Z98.890 STATUS POST THORACIC AORTIC ANEURYSM REPAIR: ICD-10-CM

## 2021-02-24 DIAGNOSIS — Q23.1 BICUSPID AORTIC VALVE: Chronic | ICD-10-CM

## 2021-02-24 PROCEDURE — 99215 OFFICE O/P EST HI 40 MIN: CPT | Performed by: INTERNAL MEDICINE

## 2021-02-24 NOTE — ASSESSMENT & PLAN NOTE
Blood pressure is elevated today. Send home readings in 2 weeks. Goal blood pressure < 130/80. Continue amlodipine/valsartan 5/160 mg qd and metoprolol succinate 25 mg qd for now.     BP Readings from Last 3 Encounters:   02/24/21 150/82   08/14/20 120/78   02/18/20 120/80

## 2021-02-24 NOTE — PATIENT INSTRUCTIONS
** IMPORTANT MESSAGE FROM DR. MORSE **    In our office, your satisfaction is VERY important to us.     You may receive a survey from [facility] by mail or E-mail for you to provide feedback about your visit. This information is invaluable for me to know what we can do to improve our services.     I ask that you please take a few minutes to complete the survey and let us know how we are doing in serving your needs. (You may receive the survey more than once for multiple visits)    Thank You !    Dr. Morse & Staff    _________________________________________________________________________________________________________________________      ** ADDITIONAL INSTRUCTION / REMINDERS FROM DR. MORSE **

## 2021-02-24 NOTE — PROGRESS NOTES
I N T E R N A L  M E D I C I N E  J U N O H  K I M,  M D      ENCOUNTER DATE:  02/24/2021    Chapincito Nguyen / 70 y.o. / male      CHIEF COMPLAINT / REASON FOR OFFICE VISIT     Establish Care (dr rose )      ASSESSMENT & PLAN     Problem List Items Addressed This Visit     Hypertension - Primary (Chronic)    Current Assessment & Plan     Blood pressure is elevated today. Send home readings in 2 weeks. Goal blood pressure < 130/80. Continue amlodipine/valsartan 5/160 mg qd and metoprolol succinate 25 mg qd for now.     BP Readings from Last 3 Encounters:   02/24/21 150/82   08/14/20 120/78   02/18/20 120/80             Relevant Medications    metoprolol succinate XL (TOPROL-XL) 25 MG 24 hr tablet    amLODIPine-valsartan (EXFORGE) 5-160 MG per tablet    Other Relevant Orders    Lipid Panel With / Chol / HDL Ratio    Comprehensive Metabolic Panel    Bicuspid aortic valve (Chronic)    Overview     Card. Dr Valles Yearly visits         Relevant Medications    sildenafil (VIAGRA) 100 MG tablet    metoprolol succinate XL (TOPROL-XL) 25 MG 24 hr tablet    Status post thoracic aortic aneurysm repair (Chronic)    Overview     S/p endovascular stent graft (2007) followed by vascular surgery and cardiology  Goal blood pressure < 130/80.          H/O aortic coarctation repair (Chronic)    Prostate cancer (CMS/HCC)    Overview     Diagnosis May 2017, treated with radiation therapy.  Completed as of July 25, 2017, follow-up with urology, .  JS             Orders Placed This Encounter   Procedures   • Lipid Panel With / Chol / HDL Ratio   • Comprehensive Metabolic Panel     No orders of the defined types were placed in this encounter.      SUMMARY/DISCUSSION  • Spent 40 minutes in total encounter time.       Next Appointment with me: 5/25/2021    Return in about 3 months (around 5/24/2021) for Reassess chronic medical problems.      VITAL SIGNS     Visit Vitals  /82   Pulse 68   Temp 97.3 °F (36.3 °C)   Ht 182.9 cm  "(72.01\")   Wt 87.1 kg (192 lb)   SpO2 98%   BMI 26.03 kg/m²       BP Readings from Last 3 Encounters:   02/24/21 150/82   08/14/20 120/78   02/18/20 120/80     Wt Readings from Last 3 Encounters:   02/24/21 87.1 kg (192 lb)   08/14/20 85 kg (187 lb 6.4 oz)   02/18/20 81.6 kg (180 lb)     Body mass index is 26.03 kg/m².      MEDICATIONS AT THE TIME OF OFFICE VISIT     Current Outpatient Medications on File Prior to Visit   Medication Sig   • amLODIPine-valsartan (EXFORGE) 5-160 MG per tablet Take 1 tablet by mouth Daily.   • aspirin 81 MG EC tablet Take 81 mg by mouth Daily.   • metoprolol succinate XL (TOPROL-XL) 25 MG 24 hr tablet Take 1 tablet by mouth Daily.   • sildenafil (VIAGRA) 100 MG tablet Take 100 mg by mouth Daily.       HISTORY OF PRESENT ILLNESS     69 yo male here to establish care. Former physicians were Dr. Ramey and Karol.     S/p repair of coarctation of aorta in 1987, s/p thoracic pseudoaneurysm repair in 1993, thoracic aortic stent and subsequent carotid-subclavian bypass graft (for endoleak) in 2006. Treated for hypertension on amlodipine/benazepril and metoprolol succinate. Denies any chest pain, palpitations, unusual back pain, claudications or stroke/TIA symptoms.     S/p XRT for prostate cancer in 2017.     Remote history of depression doing fine currently.         REVIEW OF SYSTEMS     No chest pain, palpitations RICHARDS   No denies RICHARDS, PND/orthopnea, worsening edema   GI neg   neg for change, + ED  Neuro neg  Psych neg       PHYSICAL EXAMINATION     Physical Exam  Constitutional: Appears well-nourished. No distress.   HENT: Head: Atraumatic. Hearing is normal.   Eyes: No scleral icterus.   Neck: No thyromegaly present. No neck mass.   Cardiovascular: Normal rate.  Normal rhythm. 2/6 systolic murmur RUSB, No carotid bruit.    Pulmonary/Chest: Effort normal and breath sounds normal.   Abdominal: Soft. No distension. No hepatomegaly. No tenderness. No mass. No pulsatile mass.   Neurological: " Alert and oriented to person, place, and time. Gait normal.   Skin: Skin is warm. No generalized rash . No pallor.   Psychiatric: Normal mood and affect. Speech is normal and behavior is normal. Judgment and thought content normal. Mood is normal. Cognition and memory are normal.          REVIEWED DATA     Labs:     Lab Results   Component Value Date     02/18/2020    K 4.2 02/18/2020    AST 11 02/18/2020    ALT 10 02/18/2020    BUN 9 02/18/2020    CREATININE 0.92 02/18/2020    CREATININE 0.90 01/22/2019    CREATININE 0.92 02/19/2018    EGFRIFNONA 82 02/18/2020    EGFRIFAFRI 99 02/18/2020       No results found for: HGBA1C    Lab Results   Component Value Date    LDL 69 02/18/2020    LDL 92 01/22/2019    HDL 37 (L) 02/18/2020    TRIG 130 02/18/2020       Lab Results   Component Value Date    TSH 1.540 02/18/2020    FREET4 1.09 02/18/2020       Lab Results   Component Value Date    WBC 4.11 02/18/2020    HGB 14.8 02/18/2020     02/18/2020         Imaging:           Medical Tests:           Summary of old records / correspondence / consultant report:     Cardiology note from [facility] re: aortic aneurysm       Request outside records:             *Examiner was wearing KN95 mask, face shield and exam gloves during the entire duration of the visit. Patient was masked the entire time.   Minimum social distance of 6 ft maintained entire visit except if physical contact was necessary as documented.     **Dragon Disclaimer:   Much of this encounter note is an electronic transcription/translation of spoken language to printed text. The electronic translation of spoken language may permit erroneous, or at times, nonsensical words or phrases to be inadvertently transcribed. Although I have reviewed the note for such errors, some may still exist.     Template created by Brennan Bui MD

## 2021-02-25 LAB
ALBUMIN SERPL-MCNC: 4.2 G/DL (ref 3.5–5.2)
ALBUMIN/GLOB SERPL: 1.8 G/DL
ALP SERPL-CCNC: 89 U/L (ref 39–117)
ALT SERPL-CCNC: 14 U/L (ref 1–41)
AST SERPL-CCNC: 17 U/L (ref 1–40)
BILIRUB SERPL-MCNC: 0.6 MG/DL (ref 0–1.2)
BUN SERPL-MCNC: 11 MG/DL (ref 8–23)
BUN/CREAT SERPL: 12.6 (ref 7–25)
CALCIUM SERPL-MCNC: 9.1 MG/DL (ref 8.6–10.5)
CHLORIDE SERPL-SCNC: 105 MMOL/L (ref 98–107)
CHOLEST SERPL-MCNC: 143 MG/DL (ref 0–200)
CHOLEST/HDLC SERPL: 3.67 {RATIO}
CO2 SERPL-SCNC: 26.9 MMOL/L (ref 22–29)
CREAT SERPL-MCNC: 0.87 MG/DL (ref 0.76–1.27)
GLOBULIN SER CALC-MCNC: 2.4 GM/DL
GLUCOSE SERPL-MCNC: 87 MG/DL (ref 65–99)
HDLC SERPL-MCNC: 39 MG/DL (ref 40–60)
LDLC SERPL CALC-MCNC: 85 MG/DL (ref 0–100)
POTASSIUM SERPL-SCNC: 4.3 MMOL/L (ref 3.5–5.2)
PROT SERPL-MCNC: 6.6 G/DL (ref 6–8.5)
SODIUM SERPL-SCNC: 142 MMOL/L (ref 136–145)
TRIGL SERPL-MCNC: 100 MG/DL (ref 0–150)
VLDLC SERPL CALC-MCNC: 19 MG/DL (ref 5–40)

## 2021-05-25 ENCOUNTER — OFFICE VISIT (OUTPATIENT)
Dept: INTERNAL MEDICINE | Age: 71
End: 2021-05-25

## 2021-05-25 VITALS
DIASTOLIC BLOOD PRESSURE: 78 MMHG | BODY MASS INDEX: 25.73 KG/M2 | TEMPERATURE: 97.7 F | HEIGHT: 72 IN | HEART RATE: 63 BPM | OXYGEN SATURATION: 96 % | WEIGHT: 190 LBS | SYSTOLIC BLOOD PRESSURE: 122 MMHG

## 2021-05-25 DIAGNOSIS — M18.12 PRIMARY OSTEOARTHRITIS OF FIRST CARPOMETACARPAL JOINT OF LEFT HAND: ICD-10-CM

## 2021-05-25 DIAGNOSIS — C61 PROSTATE CANCER (HCC): Chronic | ICD-10-CM

## 2021-05-25 DIAGNOSIS — I10 ESSENTIAL HYPERTENSION: Primary | Chronic | ICD-10-CM

## 2021-05-25 DIAGNOSIS — Q23.1 BICUSPID AORTIC VALVE: Chronic | ICD-10-CM

## 2021-05-25 PROCEDURE — 99214 OFFICE O/P EST MOD 30 MIN: CPT | Performed by: INTERNAL MEDICINE

## 2021-05-25 NOTE — PROGRESS NOTES
"    I N T E R N A L  M E D I C I N E  J U N O H  K I M,  M D      ENCOUNTER DATE:  05/25/2021    Chapincito Nguyen / 70 y.o. / male      CHIEF COMPLAINT / REASON FOR OFFICE VISIT     Hypertension, Bicuspid aortic valve, and Prostate Cancer      ASSESSMENT & PLAN     Problem List Items Addressed This Visit        High    Hypertension - Primary (Chronic)    Overview     Continue amlodipine/benazepril 5/160 mg and metoprolol succinate 25 mg qd.          Relevant Medications    metoprolol succinate XL (TOPROL-XL) 25 MG 24 hr tablet    amLODIPine-valsartan (EXFORGE) 5-160 MG per tablet       Medium    Bicuspid aortic valve (Chronic)    Overview     Card. Dr Valles Yearly visits         Current Assessment & Plan     Asymptomatic. Continue follow-up with cardiologist with annual echocardiogram.          Relevant Medications    sildenafil (VIAGRA) 100 MG tablet    metoprolol succinate XL (TOPROL-XL) 25 MG 24 hr tablet    Primary osteoarthritis of first carpometacarpal joint of left hand (Chronic)    Current Assessment & Plan     Recommended Voltaren 1% gel BID PRN OTC.             Low    Prostate cancer (CMS/HCC) (Chronic)    Overview     Diagnosis May 2017, treated with radiation therapy.  Completed as of July 25, 2017, follow-up with urology, .  JS         Current Assessment & Plan     Continue follow-up with urologist.              No orders of the defined types were placed in this encounter.    No orders of the defined types were placed in this encounter.      SUMMARY/DISCUSSION  •       Next Appointment with me: Visit date not found    Return in about 6 months (around 11/25/2021) for COMBINED SUBSEQUENT AWV and medical f/u (30 min).      VITAL SIGNS     Visit Vitals  /78 (BP Location: Right arm)   Pulse 63   Temp 97.7 °F (36.5 °C)   Ht 182.9 cm (72.01\")   Wt 86.2 kg (190 lb)   SpO2 96%   BMI 25.76 kg/m²       BP Readings from Last 3 Encounters:   05/25/21 122/78   02/24/21 150/82   08/14/20 120/78     Wt " Readings from Last 3 Encounters:   05/25/21 86.2 kg (190 lb)   02/24/21 87.1 kg (192 lb)   08/14/20 85 kg (187 lb 6.4 oz)     Body mass index is 25.76 kg/m².      MEDICATIONS AT THE TIME OF OFFICE VISIT     Current Outpatient Medications on File Prior to Visit   Medication Sig   • amLODIPine-valsartan (EXFORGE) 5-160 MG per tablet Take 1 tablet by mouth Daily.   • aspirin 81 MG EC tablet Take 81 mg by mouth Daily.   • metoprolol succinate XL (TOPROL-XL) 25 MG 24 hr tablet Take 1 tablet by mouth Daily.   • sildenafil (VIAGRA) 100 MG tablet Take 100 mg by mouth Daily.     No current facility-administered medications on file prior to visit.          HISTORY OF PRESENT ILLNESS     ***      Patient Care Team:  Teddy Bui MD as PCP - General (Internal Medicine)  Mendy, Chandan ROCKWELL MD as Consulting Physician (Urology)  Yoshi Valles MD as Consulting Physician (Cardiology)  Chester, Db Andrade MD (Vascular Surgery)    REVIEW OF SYSTEMS           PHYSICAL EXAMINATION     Physical Exam  ***      REVIEWED DATA     Labs:     Lab Results   Component Value Date     02/24/2021    K 4.3 02/24/2021    CALCIUM 9.1 02/24/2021    AST 17 02/24/2021    ALT 14 02/24/2021    BUN 11 02/24/2021    CREATININE 0.87 02/24/2021    CREATININE 0.92 02/18/2020    CREATININE 0.90 01/22/2019    EGFRIFNONA 87 02/24/2021    EGFRIFAFRI 105 02/24/2021       No results found for: HGBA1C    Lab Results   Component Value Date    LDL 85 02/24/2021    LDL 69 02/18/2020    HDL 39 (L) 02/24/2021    TRIG 100 02/24/2021       Lab Results   Component Value Date    TSH 1.540 02/18/2020    FREET4 1.09 02/18/2020       Lab Results   Component Value Date    WBC 4.11 02/18/2020    HGB 14.8 02/18/2020     02/18/2020         Imaging:           Medical Tests:           Summary of old records / correspondence / consultant report:           Request outside records:             *Examiner was wearing KN95 mask during the entire duration of the  visit. Patient was masked the entire time.   Minimum social distance of 6 ft maintained entire visit except if physical contact was necessary as documented.     **Dragon Disclaimer:   Much of this encounter note is an electronic transcription/translation of spoken language to printed text. The electronic translation of spoken language may permit erroneous, or at times, nonsensical words or phrases to be inadvertently transcribed. Although I have reviewed the note for such errors, some may still exist.     Template created by Brennan Bui MD

## 2021-05-25 NOTE — PROGRESS NOTES
"    I N T E R N A L  M E D I C I N E  J U N O H  K I M,  M D      ENCOUNTER DATE:  05/25/2021    Chapincito Nguyen / 70 y.o. / male      CHIEF COMPLAINT / REASON FOR OFFICE VISIT     Hypertension, Bicuspid aortic valve, and Prostate Cancer      ASSESSMENT & PLAN     Problem List Items Addressed This Visit        High    Hypertension - Primary (Chronic)    Overview     Continue amlodipine/benazepril 5/160 mg and metoprolol succinate 25 mg qd.          Relevant Medications    metoprolol succinate XL (TOPROL-XL) 25 MG 24 hr tablet    amLODIPine-valsartan (EXFORGE) 5-160 MG per tablet       Medium    Bicuspid aortic valve (Chronic)    Overview     Card. Dr Valles Yearly visits         Current Assessment & Plan     Asymptomatic. Continue follow-up with cardiologist with annual echocardiogram.          Relevant Medications    sildenafil (VIAGRA) 100 MG tablet    metoprolol succinate XL (TOPROL-XL) 25 MG 24 hr tablet    Primary osteoarthritis of first carpometacarpal joint of left hand (Chronic)    Current Assessment & Plan     Recommended Voltaren 1% gel BID PRN OTC.             Low    Prostate cancer (CMS/HCC) (Chronic)    Overview     Diagnosis May 2017, treated with radiation therapy.  Completed as of July 25, 2017, follow-up with urology, .  JS         Current Assessment & Plan     Continue follow-up with urologist.              No orders of the defined types were placed in this encounter.    No orders of the defined types were placed in this encounter.      SUMMARY/DISCUSSION  •       Next Appointment with me: Visit date not found    Return in about 6 months (around 11/25/2021) for COMBINED SUBSEQUENT AWV and medical f/u (30 min).      VITAL SIGNS     Visit Vitals  /78 (BP Location: Right arm)   Pulse 63   Temp 97.7 °F (36.5 °C)   Ht 182.9 cm (72.01\")   Wt 86.2 kg (190 lb)   SpO2 96%   BMI 25.76 kg/m²       BP Readings from Last 3 Encounters:   05/25/21 122/78   02/24/21 150/82   08/14/20 120/78     Wt " Readings from Last 3 Encounters:   05/25/21 86.2 kg (190 lb)   02/24/21 87.1 kg (192 lb)   08/14/20 85 kg (187 lb 6.4 oz)     Body mass index is 25.76 kg/m².      MEDICATIONS AT THE TIME OF OFFICE VISIT     Current Outpatient Medications on File Prior to Visit   Medication Sig   • amLODIPine-valsartan (EXFORGE) 5-160 MG per tablet Take 1 tablet by mouth Daily.   • aspirin 81 MG EC tablet Take 81 mg by mouth Daily.   • metoprolol succinate XL (TOPROL-XL) 25 MG 24 hr tablet Take 1 tablet by mouth Daily.   • sildenafil (VIAGRA) 100 MG tablet Take 100 mg by mouth Daily.     No current facility-administered medications on file prior to visit.          HISTORY OF PRESENT ILLNESS     Home blood pressure looks good < 130/80 consistently on same medications.   No chest pain, RICHARDS, palpitation or lightheadedness.   PSA stable with urologist, status post XRT for cancer in 2017.   History of right 1st MCP replacement, complains of pain of left hand. Plays guitar.       Patient Care Team:  Teddy Bui MD as PCP - General (Internal Medicine)  Chandan Brooke MD as Consulting Physician (Urology)  Yoshi Valles MD as Consulting Physician (Cardiology)  Chester, Db Andrade MD (Vascular Surgery)    REVIEW OF SYSTEMS     Constitutional neg except per HPI   Resp neg  CV neg    neg for change      PHYSICAL EXAMINATION     Physical Exam  General: Alert with intact judgment   Cardiovascular Rate: normal. Rhythm: regular. Heart sounds: normal. No lower extremity edema   1st MCP arthritis of left hand       REVIEWED DATA     Labs:     Lab Results   Component Value Date     02/24/2021    K 4.3 02/24/2021    CALCIUM 9.1 02/24/2021    AST 17 02/24/2021    ALT 14 02/24/2021    BUN 11 02/24/2021    CREATININE 0.87 02/24/2021    CREATININE 0.92 02/18/2020    CREATININE 0.90 01/22/2019    EGFRIFNONA 87 02/24/2021    EGFRIFAFRI 105 02/24/2021       No results found for: HGBA1C    Lab Results   Component Value Date    LDL  85 02/24/2021    LDL 69 02/18/2020    HDL 39 (L) 02/24/2021    TRIG 100 02/24/2021       Lab Results   Component Value Date    TSH 1.540 02/18/2020    FREET4 1.09 02/18/2020       Lab Results   Component Value Date    WBC 4.11 02/18/2020    HGB 14.8 02/18/2020     02/18/2020         Imaging:           Medical Tests:     DATE OF EXAM: 02/12/2020  12:49   EXAMINATION(S): ECHO DOPPLER 2D MMODE SPECT COLOR COMPLETE      Overall left ventricular function is normal.    The estimated ejection fraction is 55-60%.    Aortic valve appears bicuspid.    There is no evidence of aortic stenosis noted.    No evidence of aortic valve regurgitation.    Mild to moderate dilation of the ascending aorta, 4.60-4.65 cm.         Summary of old records / correspondence / consultant report:           Request outside records:             *Examiner was wearing KN95 mask during the entire duration of the visit. Patient was masked the entire time.   Minimum social distance of 6 ft maintained entire visit except if physical contact was necessary as documented.     **Dragon Disclaimer:   Much of this encounter note is an electronic transcription/translation of spoken language to printed text. The electronic translation of spoken language may permit erroneous, or at times, nonsensical words or phrases to be inadvertently transcribed. Although I have reviewed the note for such errors, some may still exist.     Template created by Brennan Bui MD

## 2021-08-28 NOTE — PROGRESS NOTES
Subjective   Chapincito Nguyen is a 67 y.o. male.     History of Present Illness she presents today for subsequent annual Medicare wellness evaluation.    Hypertension: He is compliant with medication, dietary salt restriction, regular physical activity, but does not monitor blood pressure the outpatient setting.    Immunization update, Prevnar will be provided today, Pneumovax next year, we also discussed the new shingles vaccine.    Exposure to influenza.  Patient's wife was recently diagnosed with flu this past Wednesday, he is agreeable to using prophylactic Tamiflu.    The following portions of the patient's history were reviewed and updated as appropriate: allergies, current medications, past family history, past medical history, past social history, past surgical history and problem list.    Review of Systems   Constitutional: Negative.    HENT: Negative.    Respiratory: Negative.    Cardiovascular: Negative.    Gastrointestinal: Negative.    Endocrine: Negative.    Genitourinary: Negative.    Musculoskeletal: Positive for arthralgias.        Occasional knee arthralgia   Skin: Negative.    Allergic/Immunologic: Negative for immunocompromised state.   Neurological: Negative.    Hematological: Negative.    Psychiatric/Behavioral: Negative.        Objective   Physical Exam   Constitutional: He is oriented to person, place, and time. He appears well-developed and well-nourished. No distress.   HENT:   Head: Normocephalic and atraumatic.   Right Ear: Tympanic membrane, external ear and ear canal normal.   Left Ear: Tympanic membrane, external ear and ear canal normal.   Mouth/Throat: Uvula is midline, oropharynx is clear and moist and mucous membranes are normal.   Eyes: Conjunctivae and EOM are normal. Pupils are equal, round, and reactive to light.   Neck: Normal range of motion. Neck supple. No JVD present. Carotid bruit is not present. No thyromegaly present.   Cardiovascular: Normal rate, regular rhythm, normal  heart sounds and intact distal pulses.  Exam reveals no gallop and no friction rub.    No murmur heard.  Pulmonary/Chest: Effort normal and breath sounds normal. He has no wheezes. He has no rales.   Abdominal: Soft. Bowel sounds are normal. There is no hepatosplenomegaly. There is no tenderness.   Genitourinary:   Genitourinary Comments: Defer to urology   Musculoskeletal: Normal range of motion. He exhibits no edema or deformity.   Lymphadenopathy:     He has no cervical adenopathy.   Neurological: He is alert and oriented to person, place, and time. He has normal strength and normal reflexes. No cranial nerve deficit or sensory deficit. Coordination normal.   Skin: Skin is warm and dry. No rash noted.   Psychiatric: He has a normal mood and affect. His speech is normal and behavior is normal. Judgment and thought content normal. Cognition and memory are normal.   Nursing note and vitals reviewed.      Assessment/Plan   Chapincito was seen today for annual exam.    Diagnoses and all orders for this visit:    Medicare annual wellness visit, subsequent  -     CBC & Differential    Essential hypertension  -     Comprehensive Metabolic Panel  -     Lipid Panel    Encounter for immunization  -     Pneumococcal Conjugate Vaccine 13-Valent All    Exposure to influenza  -     Discontinue: oseltamivir (TAMIFLU) 75 MG capsule; Take 1 capsule by mouth 2 (Two) Times a Day.  -     oseltamivir (TAMIFLU) 75 MG capsule; Take 1 capsule by mouth 2 (Two) Times a Day.        Number-one Medicare exam, clinically stable, see comments above and below, lab as above, patient will sign an ABN for CBC since is not a covered benefit.  Recommend repeat exam one year.    #2 hypertension stable on current medication.  Blood pressure check 6 months, lab as above, follow-up results online.    #3 immunization update Prevnar today, Pneumovax next year.    #4 flu exposure, will begin prophylactic Tamiflu treatment today.  Patient is aware that if it is a  flu develop, he will change the dosage to 1 tablet twice daily        (1) Minor paralysis (flattened nasolabial fold, asymmetry on smiling)

## 2021-09-07 DIAGNOSIS — I10 ESSENTIAL HYPERTENSION: ICD-10-CM

## 2021-09-08 RX ORDER — METOPROLOL SUCCINATE 25 MG/1
TABLET, EXTENDED RELEASE ORAL
Qty: 90 TABLET | Refills: 3 | Status: SHIPPED | OUTPATIENT
Start: 2021-09-08 | End: 2022-12-13

## 2021-09-08 RX ORDER — AMLODIPINE AND VALSARTAN 5; 160 MG/1; MG/1
TABLET ORAL
Qty: 90 TABLET | Refills: 3 | Status: SHIPPED | OUTPATIENT
Start: 2021-09-08

## 2021-12-02 ENCOUNTER — OFFICE VISIT (OUTPATIENT)
Dept: INTERNAL MEDICINE | Age: 71
End: 2021-12-02

## 2021-12-02 VITALS
WEIGHT: 186 LBS | TEMPERATURE: 97.7 F | OXYGEN SATURATION: 97 % | HEIGHT: 72 IN | DIASTOLIC BLOOD PRESSURE: 60 MMHG | HEART RATE: 68 BPM | BODY MASS INDEX: 25.19 KG/M2 | SYSTOLIC BLOOD PRESSURE: 128 MMHG

## 2021-12-02 DIAGNOSIS — Z00.00 MEDICARE ANNUAL WELLNESS VISIT, SUBSEQUENT: Primary | ICD-10-CM

## 2021-12-02 DIAGNOSIS — M15.9 PRIMARY OSTEOARTHRITIS INVOLVING MULTIPLE JOINTS: Chronic | ICD-10-CM

## 2021-12-02 DIAGNOSIS — I10 PRIMARY HYPERTENSION: Chronic | ICD-10-CM

## 2021-12-02 DIAGNOSIS — C61 PROSTATE CANCER (HCC): Chronic | ICD-10-CM

## 2021-12-02 DIAGNOSIS — Q23.1 BICUSPID AORTIC VALVE: Chronic | ICD-10-CM

## 2021-12-02 PROBLEM — M15.0 PRIMARY OSTEOARTHRITIS INVOLVING MULTIPLE JOINTS: Chronic | Status: ACTIVE | Noted: 2021-05-25

## 2021-12-02 PROCEDURE — 1170F FXNL STATUS ASSESSED: CPT | Performed by: INTERNAL MEDICINE

## 2021-12-02 PROCEDURE — 99214 OFFICE O/P EST MOD 30 MIN: CPT | Performed by: INTERNAL MEDICINE

## 2021-12-02 PROCEDURE — 90662 IIV NO PRSV INCREASED AG IM: CPT | Performed by: INTERNAL MEDICINE

## 2021-12-02 PROCEDURE — 1160F RVW MEDS BY RX/DR IN RCRD: CPT | Performed by: INTERNAL MEDICINE

## 2021-12-02 PROCEDURE — G0439 PPPS, SUBSEQ VISIT: HCPCS | Performed by: INTERNAL MEDICINE

## 2021-12-02 PROCEDURE — G0008 ADMIN INFLUENZA VIRUS VAC: HCPCS | Performed by: INTERNAL MEDICINE

## 2021-12-02 NOTE — PROGRESS NOTES
"    I N T E R N A L  M E D I C I N E  J U N O H  K I M,  M D      ENCOUNTER DATE:  12/02/2021    Chapincito Nguyen / 71 y.o. / male        MEDICARE ANNUAL WELLNESS VISIT       Chief Complaint: AWV    Patient's general assessment of his health since a year ago:     - Compared to one year ago, he feels his physical health is about the same without significant change.    - Compared to one year ago, he feels his mental health is about the same without significant change.      HPI for other active medical problems:     Chronic essential hypertension stable on amlodipine-valsartan 5/160 mg and metoprolol XL 25 mg daily.  Followed by his urologist for history of prostate cancer status post radiation treatment.  Denies changes in urination or PSA level.  History of bicuspid aortic valve and prior history of multiple vascular surgeries including surgical repair of aortic coarctation which is monitored by his cardiologist.  Patient denies any angina increased dyspnea exertion or palpitations.        * The required components of Health Risk Assessment (HRA) that were completed by the patient and/or my staff are contained within this note and in the scanned documents titled \"Health Risk Assessment\" within the media section of the patient's chart in [employer].         HISTORY       Recent Hospitalizations:    Recent hospitalization?:     If YES, location, date, and diagnoses:     · Location:   · Date:   · Principle Discharge Dx:   · Secondary Dx:       Patient Care Team:    Patient Care Team:  Teddy Bui MD as PCP - General (Internal Medicine)  Chandan Brooke MD as Consulting Physician (Urology)  Ysohi Valles MD as Consulting Physician (Cardiology)  Chester, Db Andrade MD (Vascular Surgery)      Allergies:  Patient has no known allergies.    Medications:  Current Outpatient Medications on File Prior to Visit   Medication Sig Dispense Refill   • amLODIPine-valsartan (EXFORGE) 5-160 MG per tablet TAKE 1 TABLET EVERY " DAY 90 tablet 3   • aspirin 81 MG EC tablet Take 81 mg by mouth Daily.     • metoprolol succinate XL (TOPROL-XL) 25 MG 24 hr tablet TAKE 1 TABLET EVERY DAY 90 tablet 3   • sildenafil (VIAGRA) 100 MG tablet Take 100 mg by mouth Daily.       No current facility-administered medications on file prior to visit.        PFSH:     The following portions of the patient's history were reviewed and updated as appropriate: Allergies / Current Medications / Past Medical History / Surgical History / Social History / Family History    Problem List:  Patient Active Problem List   Diagnosis   • Impotence of organic origin   • Hypertension   • Bicuspid aortic valve   • H/O aortic coarctation repair   • Status post thoracic aortic aneurysm repair   • Prostate cancer (HCC)   • Primary osteoarthritis involving multiple joints       Past Medical History:  Past Medical History:   Diagnosis Date   • BPH (benign prostatic hypertrophy)    • Coarctation of aorta     Original in 1987, had a bleed in 1993 which was repaired and in 2006 where he had stents placed.   • Depression 4/21/2016    Stopped antidepressant 2007   • Hypertension    • Primary osteoarthritis of first carpometacarpal joint of left hand 5/25/2021   • Prostate cancer (HCC) 04/2017    completed radiation therapy (43 sessions) on July 25, 2017       Past Surgical History:  Past Surgical History:   Procedure Laterality Date   • CAROTID ARTERY - SUBCLAVIAN ARTERY BYPASS GRAFT Left 03/11/2006    for endograft leak   • COARCTATION OF AORTA EXCISION  1987    open repair   • COLONOSCOPY  2017    WNL   • PSEUDOANEURYSM REPAIR  1993    thoracid pseudoaneurysm repair   • THORACIC AORTA STENT  03/09/2006    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)       Social History:  Social History     Socioeconomic History   • Marital status:      Spouse name: Tracy   • Number of children: 3   Tobacco Use   • Smoking status: Never Smoker   • Smokeless tobacco: Never Used  "  Substance and Sexual Activity   • Alcohol use: Yes     Types: 1 - 2 Glasses of wine, 1 - 2 Cans of beer per week     Comment: 1-2 per week   • Drug use: No   • Sexual activity: Yes     Partners: Female     Birth control/protection: Surgical     Comment: vasectomy in        Family History:  Family History   Problem Relation Age of Onset   • Breast cancer Mother          at age 40   • Mental illness Mother    • Miscarriages / Stillbirths Mother    • Breast cancer Maternal Aunt    • Cancer Maternal Aunt    • Cancer Maternal Grandfather    • No Known Problems Sister    • Lung cancer Other    • Other Father         supranuclear palsy   • Other Son         mood disorder   • Heart disease Neg Hx          PATIENT ASSESSMENT     Vitals:  /60 (BP Location: Right arm)   Pulse 68   Temp 97.7 °F (36.5 °C)   Ht 182.9 cm (72.01\")   Wt 84.4 kg (186 lb)   SpO2 97%   BMI 25.22 kg/m²   BP Readings from Last 3 Encounters:   21 128/60   21 122/78   21 150/82     Wt Readings from Last 3 Encounters:   21 84.4 kg (186 lb)   21 86.2 kg (190 lb)   21 87.1 kg (192 lb)      Body mass index is 25.22 kg/m².    There were no vitals filed for this visit.      Review of Systems:    Review of Systems  Constitutional neg except per HPI   Resp neg  CV neg   GI neg   neg for change  neruo neg  Psych neg   MuSk knee, shoulder pain     Physical Exam:    Physical Exam  General: No acute distress  Psych: Normal thought and judgment   Cardiovascular Rate: normal. Rhythm: regular.   Pulm/Chest: Effort normal, breath sounds normal.   MuSk left shoulder with mild crepitus, without swelling or tenderness     Reviewed Data:    Labs:   Lab Results   Component Value Date     2021    K 4.3 2021    CALCIUM 9.1 2021    AST 17 2021    ALT 14 2021    BUN 11 2021    CREATININE 0.87 2021    CREATININE 0.92 2020    CREATININE 0.90 2019    EGFRIFNONA 87 " 02/24/2021    EGFRIFAFRI 105 02/24/2021       No results found for: GLU, HGBA1C, MICROALBUR    Lab Results   Component Value Date    LDL 85 02/24/2021    LDL 69 02/18/2020    LDL 92 01/22/2019    HDL 39 (L) 02/24/2021    TRIG 100 02/24/2021    CHOLHDLRATIO 3.67 02/24/2021       Lab Results   Component Value Date    TSH 1.540 02/18/2020    FREET4 1.09 02/18/2020          Lab Results   Component Value Date    WBC 4.11 02/18/2020    HGB 14.8 02/18/2020    HGB 14.8 02/19/2018     02/18/2020                   Lab Results   Component Value Date    PSA 5.070 (H) 02/21/2017    PSA 4.470 (H) 05/06/2016    PSA 5.700 (H) 04/21/2016       Imaging:          Medical Tests:          Screening for Glaucoma:  Previous screening for glaucoma?: Yes      Hearing Loss Screen:  Finger Rub Hearing Test (right ear): passed  Finger Rub Hearing Test (left ear): passed      Urinary Incontinence Screen:  Episodes of urinary incontinence? : No      Depression Screen:  PHQ-2/PHQ-9 Depression Screening 12/2/2021   Little interest or pleasure in doing things 0   Feeling down, depressed, or hopeless 0   Total Score 0        PHQ-2: 0 (Not depressed)    PHQ-9: 0 (Negative screening for depression)       FUNCTIONAL, FALL RISK, & COGNITIVE SCREENING (Components below):    DATA:    Functional & Cognitive Status 12/2/2021   Do you have difficulty preparing food and eating? No   Do you have difficulty bathing yourself, getting dressed or grooming yourself? No   Do you have difficulty using the toilet? No   Do you have difficulty moving around from place to place? No   Do you have trouble with steps or getting out of a bed or a chair? No   Current Diet Well Balanced Diet   Dental Exam Up to date   Eye Exam Up to date   Exercise (times per week) 1 times per week   Current Exercises Include Yard Work   Do you need help using the phone?  No   Are you deaf or do you have serious difficulty hearing?  No   Do you need help with transportation? No   Do you  need help shopping? No   Do you need help preparing meals?  No   Do you need help with housework?  No   Do you need help with laundry? No   Do you need help taking your medications? No   Do you need help managing money? No   Do you ever drive or ride in a car without wearing a seat belt? No   Have you felt unusual stress, anger or loneliness in the last month? -   Who do you live with? -   If you need help, do you have trouble finding someone available to you? -   Have you been bothered in the last four weeks by sexual problems? -   Do you have difficulty concentrating, remembering or making decisions? No         A) Assessment of Functional Ability:  (Assessment of ability to perform ADL's (showering/bathing, using toilet, dressing, feeding self, moving self around) and IADL's (use telephone, shop, prepare food, housekeep, do laundry, transport independently, take medications independently, and handle finances)    Degree of functional impairment: NONE (based on assessment noted above)      B) Assessment of Fall Risk:  Fall Risk Assessment was completed, and patient is at low risk for falls.       Need for further evaluation of gait, strength, and balance? : No    Timed Up and Go (TUG):   (>= 12 seconds indicates high risk for falling)    Observable abnormalities included: Normal gait pattern       C. Assessment of Cognitive Function:    Mini-Cog Test:     1) Registration (3 objects): Yes   2) Number of objects recalled: 3   3) Clock Draw: Passed? : Yes       Further evaluation required? : No        COUNSELING       A. Identification of Health Risk Factors:    Risk factors include: cardiovascular risk factors and chronic pain      B. Age-Appropriate Screening Schedule:  (Refer to the list below for future screening recommendations based on patient's age, sex and/or medical conditions. Orders for these recommended tests are listed in the plan section. The patient has been provided with a written plan)    Health  Maintenance Topics  Health Maintenance   Topic Date Due   • ZOSTER VACCINE (1 of 2) Never done   • TDAP/TD VACCINES (3 - Td or Tdap) 08/08/2030   • INFLUENZA VACCINE  Completed       Health Maintenance Topics Due or Over-Due  Health Maintenance Due   Topic Date Due   • ZOSTER VACCINE (1 of 2) Never done         C. Advanced Care Planning:    Advance Care Planning   ACP discussion was held with the patient during this visit. Patient has an advance directive (not in EMR), copy requested.       D. Patient Self-Management and Personalized Health Advice:    He has been provided with personalized counseling/information (including brochures/handouts) about:     -- reducing risk for cardiac/vascular events with pre-existing disease and management of chronic pain with focus on minimizing use of narcotic pain medications      He has been recommended for the following preventative services which has been performed today, will be ordered today or ordered/performed on upcoming follow-up visit:     -- NUTRITION counseling provided, EXERCISE counseling provided, CARDIOVASCULAR disease risk reduction counseling performed, FALL RISK assessment / plan of care completed, URINARY incontinence assessment done, has hx of prostate cancer and is monitored by urologist/here with annual PSA, vaccination for INFLUENZA administered/ (or recommended), vaccination for SHINGRIX administered  (or recommended), vaccination for HEPATITIS A administered (or recommended)      E. Miscellaneous Items:    -Aspirin use counseling: Taking ASA appropriately as indicated    -Discussed BMI with him. The BMI is in the acceptable range    -Reviewed use of high risk medication in the elderly: YES    -Reviewed for potential of harmful drug interactions in the elderly: YES        WRAP UP       Assessment & Plan:    1) MEDICARE ANNUAL WELLNESS VISIT    2) OTHER MEDICAL CONDITIONS ADDRESSED TODAY:            Problem List Items Addressed This Visit        High     Hypertension (Chronic)    Overview     Continue amlodipine/benazepril 5/160 mg and metoprolol succinate 25 mg qd.          Relevant Medications    amLODIPine-valsartan (EXFORGE) 5-160 MG per tablet    metoprolol succinate XL (TOPROL-XL) 25 MG 24 hr tablet    Other Relevant Orders    Comprehensive Metabolic Panel       Medium    Primary osteoarthritis involving multiple joints (Chronic)    Current Assessment & Plan     Recommended acetaminophen in place of naproxen PRN             Low    Bicuspid aortic valve (Chronic)    Overview     * sees cardiologist          Relevant Medications    sildenafil (VIAGRA) 100 MG tablet    metoprolol succinate XL (TOPROL-XL) 25 MG 24 hr tablet    Prostate cancer (HCC) (Chronic)    Overview     Diagnosis May 2017, treated with radiation therapy.    Continue follow-up with urologist          Current Assessment & Plan     Continue follow-up with urologist            Other Visit Diagnoses     Medicare annual wellness visit, subsequent    -  Primary                    Orders Placed This Encounter   Procedures   • Fluzone High-Dose 65+yrs   • Comprehensive Metabolic Panel       Discussion / Summary:        Medications as of TODAY:              Current Outpatient Medications   Medication Sig Dispense Refill   • amLODIPine-valsartan (EXFORGE) 5-160 MG per tablet TAKE 1 TABLET EVERY DAY 90 tablet 3   • aspirin 81 MG EC tablet Take 81 mg by mouth Daily.     • metoprolol succinate XL (TOPROL-XL) 25 MG 24 hr tablet TAKE 1 TABLET EVERY DAY 90 tablet 3   • sildenafil (VIAGRA) 100 MG tablet Take 100 mg by mouth Daily.       No current facility-administered medications for this visit.         FOLLOW-UP:            Return in about 6 months (around 6/2/2022) for Reassess chronic medical problems.                 Future Appointments   Date Time Provider Department Center   6/7/2022 10:15 AM Teddy Bui MD [facility]           After Visit Summary (AVS) including the Personalized Prevention  Plan  Services (PPPS) was either printed and given to the patient at check-out today and/or sent to [facility] for review.         *Examiner was wearing KN95 mask and eye protection during the entire duration of the visit. Patient was masked the entire time. Minimum social distance of 6 ft maintained entire visit except if physical contact was necessary as documented.     **Dragon Disclaimer: Much of this encounter note is an electronic transcription/translation of spoken language to printed text. The electronic translation of spoken language may permit erroneous, or at times, nonsensical words or phrases to be inadvertently transcribed. Although I have reviewed the note for such errors, some may still exist.       Template created by Brennan Bui MD

## 2021-12-02 NOTE — PATIENT INSTRUCTIONS
** IMPORTANT MESSAGE FROM DR. MORSE **    In our office, your satisfaction is VERY important to us.     You may receive a survey from [employer] by mail or E-mail for you to provide feedback about your visit. This information is invaluable for me to know what we can do to improve our services.     I ask that you please take a few minutes to complete the survey and let us know how we are doing in serving your needs. (You may receive the survey more than once for multiple visits)    Thank You !    Dr. Morse & Staff    _________________________________________________________________________________________________________________________      ** ADDITIONAL INSTRUCTION / REMINDERS FROM DR. MORSE **      Medicare Wellness  Personal Prevention Plan of Service     Date of Office Visit:  2021  Encounter Provider:  Teddy Morse MD  Place of Service:  [facility]  Patient Name: Chapincito Nguyen  :  1950    As part of the Medicare Wellness portion of your visit today, we are providing you with this personalized preventive plan of services (PPPS). This plan is based upon recommendations of the [place] Preventive Services Task Force (USPSTF) and the Advisory Committee on Immunization Practices (ACIP).    This lists the preventive care services that should be considered, and provides dates of when you are due. Items listed as completed are up-to-date and do not require any further intervention.      Age-Appropriate Screening Schedule:  (Refer to the list below for future screening recommendations based on patient's age, sex and/or medical conditions. Orders for these recommended tests are listed in the plan section. The patient has been provided with a written plan)    Health Maintenance Topics  Health Maintenance   Topic Date Due   • ZOSTER VACCINE (1 of 2) Never done   • ANNUAL WELLNESS VISIT  2022   • COLORECTAL CANCER SCREENING  2024   • TDAP/TD VACCINES (3 - Td or Tdap)  08/08/2030   • HEPATITIS C SCREENING  Completed   • COVID-19 Vaccine  Completed   • INFLUENZA VACCINE  Completed   • Pneumococcal Vaccine 65+  Completed       Health Maintenance Topics Due or Over-Due  Health Maintenance Due   Topic Date Due   • ZOSTER VACCINE (1 of 2) Never done         ADDITIONAL RESOURCES:    For excellent information on senior health and wellness refer to the [employer] [place] [employer] web-site at:    [email or web address]

## 2021-12-03 LAB
ALBUMIN SERPL-MCNC: 4.2 G/DL (ref 3.8–4.8)
ALBUMIN/GLOB SERPL: 1.4 {RATIO} (ref 1.2–2.2)
ALP SERPL-CCNC: 86 IU/L (ref 44–121)
ALT SERPL-CCNC: 11 IU/L (ref 0–44)
AST SERPL-CCNC: 15 IU/L (ref 0–40)
BILIRUB SERPL-MCNC: 0.5 MG/DL (ref 0–1.2)
BUN SERPL-MCNC: 13 MG/DL (ref 8–27)
BUN/CREAT SERPL: 14 (ref 10–24)
CALCIUM SERPL-MCNC: 9.4 MG/DL (ref 8.6–10.2)
CHLORIDE SERPL-SCNC: 103 MMOL/L (ref 96–106)
CO2 SERPL-SCNC: 25 MMOL/L (ref 20–29)
CREAT SERPL-MCNC: 0.94 MG/DL (ref 0.76–1.27)
GLOBULIN SER CALC-MCNC: 3 G/DL (ref 1.5–4.5)
GLUCOSE SERPL-MCNC: 125 MG/DL (ref 65–99)
POTASSIUM SERPL-SCNC: 4.5 MMOL/L (ref 3.5–5.2)
PROT SERPL-MCNC: 7.2 G/DL (ref 6–8.5)
SODIUM SERPL-SCNC: 142 MMOL/L (ref 134–144)

## 2022-06-07 ENCOUNTER — OFFICE VISIT (OUTPATIENT)
Dept: INTERNAL MEDICINE | Age: 72
End: 2022-06-07

## 2022-06-07 VITALS
OXYGEN SATURATION: 99 % | TEMPERATURE: 97.7 F | HEART RATE: 68 BPM | HEIGHT: 72 IN | BODY MASS INDEX: 25.06 KG/M2 | WEIGHT: 185 LBS | SYSTOLIC BLOOD PRESSURE: 128 MMHG | DIASTOLIC BLOOD PRESSURE: 68 MMHG

## 2022-06-07 DIAGNOSIS — E78.6 LOW HDL (UNDER 40): ICD-10-CM

## 2022-06-07 DIAGNOSIS — I10 PRIMARY HYPERTENSION: Primary | ICD-10-CM

## 2022-06-07 DIAGNOSIS — Q23.1 BICUSPID AORTIC VALVE: Chronic | ICD-10-CM

## 2022-06-07 DIAGNOSIS — C61 PROSTATE CANCER: Chronic | ICD-10-CM

## 2022-06-07 PROCEDURE — 99214 OFFICE O/P EST MOD 30 MIN: CPT | Performed by: INTERNAL MEDICINE

## 2022-06-07 NOTE — ASSESSMENT & PLAN NOTE
Check labs today.     Lab Results   Component Value Date    LDL 85 02/24/2021    LDL 69 02/18/2020    LDL 92 01/22/2019     Lab Results   Component Value Date    HDL 39 (L) 02/24/2021    HDL 37 (L) 02/18/2020    HDL 42 01/22/2019     Lab Results   Component Value Date    TRIG 100 02/24/2021    TRIG 130 02/18/2020    TRIG 128 01/22/2019     Lab Results   Component Value Date    CHOLHDLRATIO 3.67 02/24/2021

## 2022-06-07 NOTE — PROGRESS NOTES
I N T E R N A L  M E D I C I N E  J U N O H  K I M,  M D      ENCOUNTER DATE:  06/07/2022    Chapincito Nguyen / 71 y.o. / male      CHIEF COMPLAINT / REASON FOR OFFICE VISIT     Hypertension, <9>Primary osteoarthritis involving multiple joints, Bicuspid aortic valve, and Prostate Cancer      ASSESSMENT & PLAN     Problem List Items Addressed This Visit        High    Hypertension - Primary (Chronic)    Overview     Continue amlodipine/benazepril 5/160 mg and metoprolol succinate 25 mg qd.            Relevant Medications    amLODIPine-valsartan (EXFORGE) 5-160 MG per tablet    metoprolol succinate XL (TOPROL-XL) 25 MG 24 hr tablet    Other Relevant Orders    Comprehensive Metabolic Panel       Medium    Low HDL (under 40) (Chronic)    Current Assessment & Plan     Check labs today.     Lab Results   Component Value Date    LDL 85 02/24/2021    LDL 69 02/18/2020    LDL 92 01/22/2019     Lab Results   Component Value Date    HDL 39 (L) 02/24/2021    HDL 37 (L) 02/18/2020    HDL 42 01/22/2019     Lab Results   Component Value Date    TRIG 100 02/24/2021    TRIG 130 02/18/2020    TRIG 128 01/22/2019     Lab Results   Component Value Date    CHOLHDLRATIO 3.67 02/24/2021               Relevant Orders    Lipid Panel With / Chol / HDL Ratio       Low    Bicuspid aortic valve (Chronic)    Overview     * sees cardiologist            Relevant Medications    sildenafil (VIAGRA) 100 MG tablet    metoprolol succinate XL (TOPROL-XL) 25 MG 24 hr tablet    Prostate cancer (HCC) (Chronic)    Overview     Diagnosis May 2017, treated with radiation therapy.    Continue follow-up with urologist                Orders Placed This Encounter   Procedures   • Comprehensive Metabolic Panel   • Lipid Panel With / Chol / HDL Ratio     No orders of the defined types were placed in this encounter.      SUMMARY/DISCUSSION  •       Next Appointment with me: Visit date not found    Return in about 6 months (around 12/7/2022) for WELCOME TO MEDICARE  "VISIT (30 MIN).      VITAL SIGNS     Vitals:    06/07/22 1007   BP: 128/68   BP Location: Right arm   Pulse: 68   Temp: 97.7 °F (36.5 °C)   SpO2: 99%   Weight: 83.9 kg (185 lb)   Height: 182.9 cm (72.01\")       BP Readings from Last 3 Encounters:   06/07/22 128/68   12/02/21 128/60   05/25/21 122/78     Wt Readings from Last 3 Encounters:   06/07/22 83.9 kg (185 lb)   12/02/21 84.4 kg (186 lb)   05/25/21 86.2 kg (190 lb)     Body mass index is 25.08 kg/m².    Blood pressure readings recorded on patient flowsheet:  No flowsheet data found.       MEDICATIONS AT THE TIME OF OFFICE VISIT     Current Outpatient Medications on File Prior to Visit   Medication Sig   • amLODIPine-valsartan (EXFORGE) 5-160 MG per tablet TAKE 1 TABLET EVERY DAY   • aspirin 81 MG EC tablet Take 81 mg by mouth Daily.   • metoprolol succinate XL (TOPROL-XL) 25 MG 24 hr tablet TAKE 1 TABLET EVERY DAY   • sildenafil (VIAGRA) 100 MG tablet Take 100 mg by mouth Daily.     No current facility-administered medications on file prior to visit.          HISTORY OF PRESENT ILLNESS     Hypertension is well controlled at home on metoprolol succinate 25 mg daily and amlodipine-valsartan 5-160 mg daily.  Home blood pressure readings reviewed.  He has low HDL but LDL remains well below 100.  He has bicuspid aortic valve which remains asymptomatic and is followed by his cardiologist.  He has prostate cancer status postradiation therapy 2017 followed by his urologist.  PSA level has remained stable.      Patient Care Team:  Teddy Bui MD as PCP - General (Internal Medicine)  Chandan Brooke MD as Consulting Physician (Urology)  Yoshi Valles MD as Consulting Physician (Cardiology)  Db Briggs MD (Vascular Surgery)    REVIEW OF SYSTEMS     Review of Systems       PHYSICAL EXAMINATION     Physical Exam  General: No acute distress  Psych: Normal thought and judgment   Cardiovascular Rate: normal. Rhythm: regular. No lower extremity " edema   Pulm/Chest: Effort normal, breath sounds normal.       REVIEWED DATA     Labs:     Lab Results   Component Value Date     12/02/2021    K 4.5 12/02/2021    CALCIUM 9.4 12/02/2021    AST 15 12/02/2021    ALT 11 12/02/2021    BUN 13 12/02/2021    CREATININE 0.94 12/02/2021    CREATININE 0.87 02/24/2021    CREATININE 0.92 02/18/2020    EGFRIFNONA 82 12/02/2021    EGFRIFAFRI 95 12/02/2021       No results found for: HGBA1C    Lab Results   Component Value Date    LDL 85 02/24/2021    LDL 69 02/18/2020    LDL 92 01/22/2019    HDL 39 (L) 02/24/2021    HDL 37 (L) 02/18/2020    TRIG 100 02/24/2021    TRIG 130 02/18/2020       Lab Results   Component Value Date    TSH 1.540 02/18/2020    FREET4 1.09 02/18/2020       Lab Results   Component Value Date    WBC 4.11 02/18/2020    HGB 14.8 02/18/2020     02/18/2020       No results found for: MALBCRERATIO       Imaging:           Medical Tests:           Summary of old records / correspondence / consultant report:           Request outside records:             *Examiner was wearing KN95 mask and eye protection during the entire duration of the visit. Patient was masked the entire time. Minimum social distance of 6 ft maintained entire visit except if physical contact was necessary as documented.       Template created by Brennan Bui MD

## 2022-06-08 LAB
ALBUMIN SERPL-MCNC: 4.2 G/DL (ref 3.8–4.8)
ALBUMIN/GLOB SERPL: 1.5 {RATIO} (ref 1.2–2.2)
ALP SERPL-CCNC: 80 IU/L (ref 44–121)
ALT SERPL-CCNC: 11 IU/L (ref 0–44)
AST SERPL-CCNC: 17 IU/L (ref 0–40)
BILIRUB SERPL-MCNC: 0.6 MG/DL (ref 0–1.2)
BUN SERPL-MCNC: 12 MG/DL (ref 8–27)
BUN/CREAT SERPL: 11 (ref 10–24)
CALCIUM SERPL-MCNC: 9 MG/DL (ref 8.6–10.2)
CHLORIDE SERPL-SCNC: 105 MMOL/L (ref 96–106)
CHOLEST SERPL-MCNC: 136 MG/DL (ref 100–199)
CHOLEST/HDLC SERPL: 4.4 RATIO (ref 0–5)
CO2 SERPL-SCNC: 23 MMOL/L (ref 20–29)
CREAT SERPL-MCNC: 1.08 MG/DL (ref 0.76–1.27)
EGFRCR SERPLBLD CKD-EPI 2021: 74 ML/MIN/1.73
GLOBULIN SER CALC-MCNC: 2.8 G/DL (ref 1.5–4.5)
GLUCOSE SERPL-MCNC: 120 MG/DL (ref 65–99)
HDLC SERPL-MCNC: 31 MG/DL
LDLC SERPL CALC-MCNC: 76 MG/DL (ref 0–99)
POTASSIUM SERPL-SCNC: 4.3 MMOL/L (ref 3.5–5.2)
PROT SERPL-MCNC: 7 G/DL (ref 6–8.5)
SODIUM SERPL-SCNC: 142 MMOL/L (ref 134–144)
TRIGL SERPL-MCNC: 170 MG/DL (ref 0–149)
VLDLC SERPL CALC-MCNC: 29 MG/DL (ref 5–40)

## 2022-12-13 ENCOUNTER — OFFICE VISIT (OUTPATIENT)
Dept: INTERNAL MEDICINE | Age: 72
End: 2022-12-13

## 2022-12-13 VITALS
HEART RATE: 92 BPM | DIASTOLIC BLOOD PRESSURE: 64 MMHG | TEMPERATURE: 97.7 F | SYSTOLIC BLOOD PRESSURE: 124 MMHG | HEIGHT: 72 IN | OXYGEN SATURATION: 98 % | BODY MASS INDEX: 24.92 KG/M2 | WEIGHT: 184 LBS

## 2022-12-13 DIAGNOSIS — Z00.00 MEDICARE ANNUAL WELLNESS VISIT, SUBSEQUENT: Primary | ICD-10-CM

## 2022-12-13 DIAGNOSIS — I10 PRIMARY HYPERTENSION: Chronic | ICD-10-CM

## 2022-12-13 DIAGNOSIS — I48.91 NEW ONSET A-FIB: ICD-10-CM

## 2022-12-13 DIAGNOSIS — I10 ESSENTIAL HYPERTENSION: ICD-10-CM

## 2022-12-13 DIAGNOSIS — Q23.1 BICUSPID AORTIC VALVE: Chronic | ICD-10-CM

## 2022-12-13 DIAGNOSIS — C61 PROSTATE CANCER: Chronic | ICD-10-CM

## 2022-12-13 DIAGNOSIS — E78.6 LOW HDL (UNDER 40): Chronic | ICD-10-CM

## 2022-12-13 PROCEDURE — 1159F MED LIST DOCD IN RCRD: CPT | Performed by: INTERNAL MEDICINE

## 2022-12-13 PROCEDURE — 99214 OFFICE O/P EST MOD 30 MIN: CPT | Performed by: INTERNAL MEDICINE

## 2022-12-13 PROCEDURE — G0439 PPPS, SUBSEQ VISIT: HCPCS | Performed by: INTERNAL MEDICINE

## 2022-12-13 PROCEDURE — 1170F FXNL STATUS ASSESSED: CPT | Performed by: INTERNAL MEDICINE

## 2022-12-13 PROCEDURE — 93000 ELECTROCARDIOGRAM COMPLETE: CPT | Performed by: INTERNAL MEDICINE

## 2022-12-13 RX ORDER — METOPROLOL SUCCINATE 50 MG/1
50 TABLET, EXTENDED RELEASE ORAL DAILY
Qty: 90 TABLET | Refills: 1 | Status: SHIPPED | OUTPATIENT
Start: 2022-12-13

## 2022-12-13 NOTE — PATIENT INSTRUCTIONS
** IMPORTANT MESSAGE FROM DR. MORSE **    In our office, your satisfaction is VERY important to us.     You may receive a survey from [facility] by mail or E-mail for you to provide feedback about your visit. This information is invaluable for me to know what we can do to improve our services.     I ask that you please take a few minutes to complete the survey and let us know how we are doing in serving your needs. (You may receive the survey more than once for multiple visits)    Thank You !    Dr. Morse    _________________________________________________________________________________________________________________________      ** ADDITIONAL INSTRUCTION / REMINDERS FROM DR. MORSE **    Bring living will to Dr. Morse's office on next visit.     Come fasting for labs on next follow-up.

## 2022-12-13 NOTE — ASSESSMENT & PLAN NOTE
New diagnosis of atrial fibrillation (? Duration). Advised him to contact his cardiologist to be seen ASAP. Will increase metoprolol succinate to 50 mg daily for rate control. He will need AC for atrial fibrillation. Will defer this to his cardiologist. His cardiologist office contacted us regarding the atrial fibrillation and requested copy of the EKG which was forwarded.

## 2022-12-13 NOTE — PROGRESS NOTES
I N T E R N A L  M E D I C I N E    J U N O H  K I M,  M D      ENCOUNTER DATE:  12/13/2022    Chapincito Nguyen / 72 y.o. / male    MEDICARE ANNUAL WELLNESS VISIT       Chief Complaint: Medicare Wellness-subsequent (12/2/21)       Patient's general assessment of his health since a year ago:     - Compared to one year ago, he feels his physical health is:   [x] About the same  [] Better  [] Little worse  [] Significantly worse  []     - Compared to one year ago, he feels his mental health is   [x] About the same  [] Better  [] Little worse  [] Significantly worse  []     HPI for other active medical problems:     Patient has history of bicuspid aortic valve.  Most recent echocardiogram was in 2022 which showed no evidence of aortic valvular stenosis.  He denies chest pains, dyspnea exertion or heart palpitations.  He is on 81 mg aspirin daily for history of peripheral vascular disease.  He denies known history of coronary artery disease or history of stroke.  His blood pressure is generally well controlled on amlodipine/benazepril 5/160 mg daily and metoprolol succinate 25 mg daily.  He has history of prostate cancer status postradiation therapy in 2017 followed annually by his urologist.  Patient states that his PSA level has remained stable.    HISTORY       Recent Hospitalizations:    Recent hospitalization?:     If YES, location, date, and diagnoses:     · Location:   · Date:   · Principle Discharge Dx:   · Secondary Dx:       Patient Care Team:    Patient Care Team:  Teddy Bui MD as PCP - General (Internal Medicine)  Chandan Brooke MD as Consulting Physician (Urology)  Yoshi Valles MD as Consulting Physician (Cardiology)  Db Briggs MD (Vascular Surgery)      Allergies:  Patient has no known allergies.    Medications:  Current Outpatient Medications on File Prior to Visit   Medication Sig Dispense Refill   • amLODIPine-valsartan (EXFORGE) 5-160 MG per tablet TAKE 1 TABLET EVERY  DAY 90 tablet 3   • aspirin 81 MG EC tablet Take 81 mg by mouth Daily.     • sildenafil (VIAGRA) 100 MG tablet Take 100 mg by mouth Daily.     • [DISCONTINUED] metoprolol succinate XL (TOPROL-XL) 25 MG 24 hr tablet TAKE 1 TABLET EVERY DAY 90 tablet 3     No current facility-administered medications on file prior to visit.        PFSH:     The following portions of the patient's history were reviewed and updated as appropriate: Allergies / Current Medications / Past Medical History / Surgical History / Social History / Family History    Problem List:  Patient Active Problem List   Diagnosis   • Impotence of organic origin   • Hypertension   • Bicuspid aortic valve   • H/O aortic coarctation repair   • Status post thoracic aortic aneurysm repair   • Prostate cancer (HCC)   • Primary osteoarthritis involving multiple joints   • Low HDL (under 40)   • New onset a-fib (HCC)       Past Medical History:  Past Medical History:   Diagnosis Date   • BPH (benign prostatic hypertrophy)    • Coarctation of aorta     Original in 1987, had a bleed in 1993 which was repaired and in 2006 where he had stents placed.   • Depression 04/21/2016    Stopped antidepressant 2007   • Erectile dysfunction 2017    Resulting from prostate cancer radiation treatment   • Hypertension    • Primary osteoarthritis of first carpometacarpal joint of left hand 05/25/2021   • Prostate cancer (HCC) 04/2017    completed radiation therapy (43 sessions) on July 25, 2017       Past Surgical History:  Past Surgical History:   Procedure Laterality Date   • ADENOIDECTOMY  Childhood   • CAROTID ARTERY - SUBCLAVIAN ARTERY BYPASS GRAFT Left 03/11/2006    for endograft leak   • COARCTATION OF AORTA EXCISION  1987    open repair   • COLONOSCOPY  2017    WNL   • EYE SURGERY  Early childhood   • PSEUDOANEURYSM REPAIR  1993    thoracid pseudoaneurysm repair   • THORACIC AORTA STENT  03/09/2006    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)   •  "TONSILLECTOMY  Childhood   • VASECTOMY         Social History:  Social History     Socioeconomic History   • Marital status:      Spouse name: Tracy   • Number of children: 3   Tobacco Use   • Smoking status: Never   • Smokeless tobacco: Never   Substance and Sexual Activity   • Alcohol use: Yes     Alcohol/week: 1.0 - 2.0 standard drink     Types: 1 - 2 Cans of beer per week     Comment: 1-2 per week   • Drug use: No   • Sexual activity: Yes     Partners: Female     Birth control/protection: Surgical     Comment: vasectomy in        Family History:  Family History   Problem Relation Age of Onset   • Breast cancer Mother          at age 40   • Mental illness Mother    • Miscarriages / Stillbirths Mother    • Cancer Mother    • Breast cancer Maternal Aunt    • Cancer Maternal Aunt    • Cancer Maternal Grandfather    • No Known Problems Sister    • Lung cancer Other    • Other Father         Progressive SupraNuclear Palsey -  2006 at age  76   • Other Son         mood disorder   • Heart disease Neg Hx          PATIENT ASSESSMENT     Vitals:  Vitals:    22 0900   BP: 124/64   Pulse: 92   Temp: 97.7 °F (36.5 °C)   SpO2: 98%   Weight: 83.5 kg (184 lb)   Height: 182.9 cm (72.01\")       BP Readings from Last 3 Encounters:   22 124/64   22 128/68   21 128/60     Wt Readings from Last 3 Encounters:   22 83.5 kg (184 lb)   22 83.9 kg (185 lb)   21 84.4 kg (186 lb)      Body mass index is 24.95 kg/m².    Blood pressure readings recorded on patient flowsheet:  No flowsheet data found.         Review of Systems:    Review of Systems  Constitutional neg except per HPI   Resp neg  CV neg for chest pain, RICHARDS, palpitations   GI negative   negative for change; history of prostate cancer   neruo negative   No significant change in mood or cognition     Physical Exam:    Physical Exam  General: No acute distress  Psych: Normal thought and judgment   Cardiovascular " Rate: normal. Rhythm: irregularly regular.  Pulm/Chest: Effort normal, breath sounds normal.   No lower extremity edema     Reviewed Data:    Labs:   Lab Results   Component Value Date     06/07/2022    K 4.3 06/07/2022    CALCIUM 9.0 06/07/2022    AST 17 06/07/2022    ALT 11 06/07/2022    BUN 12 06/07/2022    CREATININE 1.08 06/07/2022    CREATININE 0.94 12/02/2021    CREATININE 0.87 02/24/2021    EGFRIFNONA 82 12/02/2021    EGFRIFAFRI 95 12/02/2021     No results found for: GLU, HGBA1C, MICROALBUR    Lab Results   Component Value Date    LDL 76 06/07/2022    LDL 85 02/24/2021    LDL 69 02/18/2020    HDL 31 (L) 06/07/2022    TRIG 170 (H) 06/07/2022    CHOLHDLRATIO 4.4 06/07/2022       Lab Results   Component Value Date    TSH 1.540 02/18/2020    FREET4 1.09 02/18/2020          Lab Results   Component Value Date    WBC 4.11 02/18/2020    HGB 14.8 02/18/2020    HGB 14.8 02/19/2018     02/18/2020                   Lab Results   Component Value Date    PSA 5.070 (H) 02/21/2017    PSA 4.470 (H) 05/06/2016    PSA 5.700 (H) 04/21/2016       Imaging:          Medical Tests:        ECG 12 Lead    Date/Time: 12/13/2022 12:47 PM  Performed by: Teddy Bui MD  Authorized by: Teddy Bui MD   Comparison: not compared with previous ECG   Previous ECG: no previous ECG available  Rhythm: atrial fibrillation  Rate: tachycardic  Conduction: conduction normal  QRS axis: normal  Other findings: non-specific ST-T wave changes    Clinical impression: abnormal EKG             Screening for Glaucoma:  Previous screening for glaucoma?:   [x] YES  [] NO  []     Hearing Loss Screen:  Finger Rub Hearing Test (right ear):   [x] PASSED  [] FAILED  [] BORDERLINE  [] HAS HEARING AID  [] USING HEARING AID  [] NOT USING HEARING AID  []     Finger Rub Hearing Test (left ear):   [x] PASSED  [] FAILED  [] BORDERLINE  [] HAS HEARING AID  [] USING HEARING AID  [] NOT USING HEARING AID  []     Urinary Incontinence Screen:  Episodes of  urinary incontinence? :  [] YES  [x] NO  [] N/A  [] WILL NEED FOLLOWUP  []       Depression Screen:  PHQ-2/PHQ-9 Depression Screening 12/13/2022   Retired PHQ-9 Total Score -   Retired Total Score -   Little Interest or Pleasure in Doing Things 0-->not at all   Feeling Down, Depressed or Hopeless 0-->not at all   PHQ-9: Brief Depression Severity Measure Score 0        PHQ-2:   [x] 0 -      NOT DEPRESSED  [] 1 -      NOT DEPRESSED  [] 2 -      NOT DEPRESSED  [] 3-6 -  DEPRESSED (PROCEED TO PHQ-9)  [] N/A - HAS DEPRESSION AND IS ON TREATMENT  [] N/A - HAS DEPRESSION AND IS NOT ON TREATMENT    PHQ-9:   [x] 0         NEGATIVE SCREENING FOR DEPRESSION  [] 1-4      MINIMAL DEPRESSION  [] 5-9      MILD DEPRESSIONNOT DEPRESSED  [] 10-14  MODERATE DEPRESSION  [] 15-19  MODERATELY SEVERE DEPRESSION  [] 20-27  SEVERE DEPRESSION    FUNCTIONAL, FALL RISK, & COGNITIVE SCREENING (Components below):    DATA:    Functional & Cognitive Status 12/13/2022   Do you have difficulty preparing food and eating? No   Do you have difficulty bathing yourself, getting dressed or grooming yourself? No   Do you have difficulty using the toilet? No   Do you have difficulty moving around from place to place? No   Do you have trouble with steps or getting out of a bed or a chair? No   Current Diet Well Balanced Diet   Dental Exam Up to date   Eye Exam Not up to date   Exercise (times per week) 3 times per week   Current Exercises Include Swimming;Yard Work;Walking   Do you need help using the phone?  No   Are you deaf or do you have serious difficulty hearing?  No   Do you need help with transportation? No   Do you need help shopping? No   Do you need help preparing meals?  No   Do you need help with housework?  No   Do you need help with laundry? No   Do you need help taking your medications? No   Do you need help managing money? No   Do you ever drive or ride in a car without wearing a seat belt? No   Have you felt unusual stress, anger or  loneliness in the last month? -   Who do you live with? -   If you need help, do you have trouble finding someone available to you? -   Have you been bothered in the last four weeks by sexual problems? -   Do you have difficulty concentrating, remembering or making decisions? No         A) Assessment of Functional Ability:  (Assessment of ability to perform ADL's (showering/bathing, using toilet, dressing, feeding self, moving self around) and IADL's (use telephone, shop, prepare food, housekeep, do laundry, transport independently, take medications independently, and handle finances)    Degree of functional impairment: (based on assessment noted above)  [x] NONE  [] MILD  [] MODERATE  [] SEVERE  [] VERY SEVERE  []     B) Assessment of Fall Risk:  [] LOW  [x] MODERATE  [] HIGH  [] VERY HIGH     Need for further evaluation of gait, strength, and balance? :  [] YES  [x] NO    Timed Up and Go (TUG):   (>= 12 seconds indicates high risk for falling)    Observable abnormalities included:  [x] NORMAL GAIT   [] SLOW CAUTIOUS PACE  [] IMPAIRED BALANCE  [] SHORT STRIDES  [] MINIMAL ARM SWING  [] UNSTEADY (MILD)  [] UNSTEADY (MODERATE)  [] UNSTEADY (SEVERE)  [] SHUFFLING GAIT  [] USES ASSISTIVE DEVICE (CANE) PROPERLY  [] USES ASSISTIVE DEVICE (WALKER) PROPERLY  [] USES ASSISTIVE DEVICE (CANE) PROPERLY  [] DOES NOT USE ASSISTIVE DEVICE PROPERLY  [] IN WHEELCHAIR   [] NOT ABLE TO BE TESTED DUE TO SAFETY CONCERNS  []     C. Assessment of Cognitive Function:    Mini-Cog Test:     1) Registration (3 objects):     [x] YES  [] NO   [] N/A HAS DOCUMENTED DEMENTIA     2) Number of objects recalled:   [x] 3  [] 2  [] 1  [] 0     3) Clock Draw: Passed? :   [] YES  [] NO   [x] N/A  [] N/A HAS DOCUMENTED DEMENTIA     Further evaluation required? :   [] YES  [x] NO   [] CLOSE OBSERVATION NEEDED   [] SCHEDULE APPOINTMENT TO EVALUATE FURTHER   [] CONTINUE REGULAR FOLLOWUP AND MANAGEMENT   []    COUNSELING       A. Identification of Health  Risk Factors:    Risk factors include: cardiovascular risk factors      B. Age-Appropriate Screening Schedule:  (Refer to the list below for future screening recommendations based on patient's age, sex and/or medical conditions. Orders for these recommended tests are listed in the plan section. The patient has been provided with a written plan)    Health Maintenance Topics  Health Maintenance   Topic Date Due   • ZOSTER VACCINE (1 of 2) Never done   • TDAP/TD VACCINES (3 - Td or Tdap) 08/08/2030   • INFLUENZA VACCINE  Completed       Health Maintenance Topics Due or Over-Due  Health Maintenance Due   Topic Date Due   • ZOSTER VACCINE (1 of 2) Never done         C. Advanced Care Planning:    Advance Care Planning   ACP discussion was held with the patient during this visit. Patient has an advance directive (not in EMR), copy requested.       D. Patient Self-Management and Personalized Health Advice:    He has been provided with personalized counseling/information (including brochures/handouts) about:     -- reducing risk for cardiovascular disease (heart, stroke, vascular) and fall prevention      He has been recommended for the following preventative services which has been performed today, will be ordered today or ordered/performed on upcoming follow-up visit:     -- NUTRITION counseling provided, EXERCISE counseling provided, EYE exam for glaucoma screening recommended, CARDIOVASCULAR disease risk reduction counseling performed, FALL RISK assessment / plan of care completed, URINARY incontinence assessment done, has hx of prostate cancer and is monitored by urologist/here with annual PSA, **COLORECTAL cancer screening (colonoscopy/Cologuard discussed or ordered)      E. Miscellaneous Items:    -Aspirin use counseling: Taking ASA appropriately as indicated    -Discussed BMI with him. The BMI is in the acceptable range    -Reviewed use of high risk medication in the elderly: YES    -Reviewed for potential of harmful  drug interactions in the elderly: YES        WRAP UP       Assessment & Plan:    1) MEDICARE ANNUAL WELLNESS VISIT    2) OTHER MEDICAL CONDITIONS ADDRESSED TODAY:            Problem List Items Addressed This Visit        High    New onset a-fib (HCC) (Chronic)    Current Assessment & Plan     New diagnosis of atrial fibrillation (? Duration). Advised him to contact his cardiologist to be seen ASAP. Will increase metoprolol succinate to 50 mg daily for rate control. He will need AC for atrial fibrillation. Will defer this to his cardiologist. His cardiologist office contacted us regarding the atrial fibrillation and requested copy of the EKG which was forwarded.          Relevant Medications    sildenafil (VIAGRA) 100 MG tablet    metoprolol succinate XL (TOPROL-XL) 50 MG 24 hr tablet       Medium    Hypertension (Chronic)    Overview     Continue amlodipine/benazepril 5/160 mg and metoprolol succinate 25 mg qd.          Relevant Medications    amLODIPine-valsartan (EXFORGE) 5-160 MG per tablet    metoprolol succinate XL (TOPROL-XL) 50 MG 24 hr tablet    Bicuspid aortic valve (Chronic)    Overview     * sees cardiologist          Relevant Medications    sildenafil (VIAGRA) 100 MG tablet    metoprolol succinate XL (TOPROL-XL) 50 MG 24 hr tablet    Low HDL (under 40) (Chronic)       Low    Prostate cancer (HCC) (Chronic)    Overview     Diagnosis May 2017, treated with radiation therapy.    Continue follow-up with urologist         Other Visit Diagnoses     Medicare annual wellness visit, subsequent    -  Primary    Essential hypertension        Relevant Medications    metoprolol succinate XL (TOPROL-XL) 50 MG 24 hr tablet                  No orders of the defined types were placed in this encounter.      Discussion / Summary:        Medications as of TODAY:              Current Outpatient Medications   Medication Sig Dispense Refill   • amLODIPine-valsartan (EXFORGE) 5-160 MG per tablet TAKE 1 TABLET EVERY DAY 90  tablet 3   • aspirin 81 MG EC tablet Take 81 mg by mouth Daily.     • metoprolol succinate XL (TOPROL-XL) 50 MG 24 hr tablet Take 1 tablet by mouth Daily. 90 tablet 1   • sildenafil (VIAGRA) 100 MG tablet Take 100 mg by mouth Daily.       No current facility-administered medications for this visit.         FOLLOW-UP:            Return in about 2 weeks (around 12/27/2022) for New afib.                 Future Appointments   Date Time Provider Department Center   12/27/2022 10:00 AM Gloria Martin APRN [facility]           After Visit Summary (AVS) including the Personalized Prevention  Plan Services (PPPS) was either printed and given to the patient at check-out today and/or sent to [facility] for review.         *Examiner was wearing KN95 mask and eye protection during the entire duration of the visit. Patient was masked the entire time. Minimum social distance of 6 ft maintained entire visit except if physical contact was necessary as documented.       Template created by Brennan Bui MD

## 2022-12-14 ENCOUNTER — TELEPHONE (OUTPATIENT)
Dept: INTERNAL MEDICINE | Age: 72
End: 2022-12-14

## 2022-12-14 NOTE — TELEPHONE ENCOUNTER
Caller: Chapincito Nguyen    Relationship: Self    Best call back number:[phone] (Home)    What is the best time to reach you: ANYTIME, ASAP    Who are you requesting to speak with (clinical staff, provider,  specific staff member): CLINICAL STAFF/ DR MORSE    Do you know the name of the person who called: CHAPINCITO NGUYEN    What was the call regarding: PATIENT STATED DR MORSE NEEDS TO BE ON THE LOOKOUT FOR INFORMATION FROM THE PATIENT'S CARDIOLOGIST DR SHAHID ABOUT WHAT THE PATIENT DISCUSSED WITH DR SHAHID TODAY 12/14/2022 ABOUT WHAT MEDICATIONS THE PATIENT HAS BEEN GIVEN AND WHAT TREATMENTS THE PATIENT WILL BE DOING    Do you require a callback: YES, ASAP IF QUESTIONS

## 2022-12-27 ENCOUNTER — OFFICE VISIT (OUTPATIENT)
Dept: INTERNAL MEDICINE | Age: 72
End: 2022-12-27

## 2022-12-27 VITALS
HEIGHT: 72 IN | HEART RATE: 59 BPM | OXYGEN SATURATION: 99 % | SYSTOLIC BLOOD PRESSURE: 116 MMHG | BODY MASS INDEX: 25.09 KG/M2 | DIASTOLIC BLOOD PRESSURE: 68 MMHG | TEMPERATURE: 98.2 F | WEIGHT: 185.2 LBS

## 2022-12-27 DIAGNOSIS — I10 PRIMARY HYPERTENSION: Chronic | ICD-10-CM

## 2022-12-27 DIAGNOSIS — I48.19 OTHER PERSISTENT ATRIAL FIBRILLATION: Primary | ICD-10-CM

## 2022-12-27 PROCEDURE — 99213 OFFICE O/P EST LOW 20 MIN: CPT | Performed by: NURSE PRACTITIONER

## 2022-12-27 RX ORDER — APIXABAN 5 MG/1
TABLET, FILM COATED ORAL
COMMUNITY
Start: 2022-12-14

## 2022-12-27 NOTE — PROGRESS NOTES
Answers for HPI/ROS submitted by the patient on 12/20/2022  What is the primary reason for your visit?: Other  Please describe your symptoms.: Recent diagnosis of A-Fib  Have you had these symptoms before?: No  How long have you been having these symptoms?: 1-2 weeks  Please list any medications you are currently taking for this condition.: Eliquis, Metropolol 100 mg daily per my cardiologist Dr Naeem Valles        I N T E R N A L  M E D I C I N E  Gloria Martin, APRN    ENCOUNTER DATE:  12/27/2022    Chapincito Nguyen / 72 y.o. / male      CHIEF COMPLAINT / REASON FOR OFFICE VISIT     Follow-up (A-FIB)      ASSESSMENT & PLAN     Diagnoses and all orders for this visit:    1. Other persistent atrial fibrillation (HCC) (Primary)  Assessment & Plan:  Patient recently seen by Dr Valles and remains in A Fib. Patient instructed to resume the Metoprolol 100 mg daily and ask Dr Valles if can change to not long-acting Metoprolol to 50 mg bid instead secondary to acute dizziness       2. Primary hypertension  Overview:  Continue amlodipine/benazepril 5/160 mg and metoprolol succinate 25 mg qd.          SUMMARY/DISCUSSION  • Discussion of Atrial Fib and stable at this time on Eliquis and Metoprolol. States secondary to dizziness he has decreased the Metoprolol from 100 mg daily to 50 mg daily. Has a follow up with Dr Valles. Patient still in A Fib and encouraged to take the Metoprolol twice daily and ask Dr Valles if he can change it to Metoprolol non timed release.   • Follow up with Dr Bui as scheduled and as needed  • I spent 30 min in direct care of this patient on this date of service. This time includes times spent by me in the following activities: Preparing for the visit, obtaining and/or reviewing a separately obtained history, performing a medically appropriate examination and/or evaluation, reviewing medical records, reviewing tests, ordering medications, tests, or procedures, counseling and educating the patient,  "documenting information in the medical record and reviewing office note/correspondence from other providers.     Return in about 3 months (around 3/27/2023) for Next scheduled follow up.      VITAL SIGNS     Visit Vitals  /68 (BP Location: Right arm, Patient Position: Sitting, Cuff Size: Adult)   Pulse 59   Temp 98.2 °F (36.8 °C) (Temporal)   Ht 182.9 cm (72.01\")   Wt 84 kg (185 lb 3.2 oz)   SpO2 99%   BMI 25.11 kg/m²           BP Readings from Last 3 Encounters:   12/27/22 116/68   12/13/22 124/64   06/07/22 128/68     Wt Readings from Last 3 Encounters:   12/27/22 84 kg (185 lb 3.2 oz)   12/13/22 83.5 kg (184 lb)   06/07/22 83.9 kg (185 lb)     Body mass index is 25.11 kg/m².    Blood pressure readings recorded on patient flowsheet:  No flowsheet data found.          MEDICATIONS AT THE TIME OF OFFICE VISIT     Current Outpatient Medications on File Prior to Visit   Medication Sig Dispense Refill   • amLODIPine-valsartan (EXFORGE) 5-160 MG per tablet TAKE 1 TABLET EVERY DAY 90 tablet 3   • Eliquis 5 MG tablet tablet      • metoprolol succinate XL (TOPROL-XL) 50 MG 24 hr tablet Take 1 tablet by mouth Daily. 90 tablet 1   • sildenafil (VIAGRA) 100 MG tablet Take 100 mg by mouth Daily.     • [DISCONTINUED] aspirin 81 MG EC tablet Take 81 mg by mouth Daily.       No current facility-administered medications on file prior to visit.        HISTORY OF PRESENT ILLNESS     72 year old male being seen today for follow up complaint of Atrial Fib. Patient recently seen by Dr Valles and started on Metoprolol succinate 100 mg daily. States has not been taking the Metoprolol as directed and has only been taking 50 mg daily due to severe dizziness the medication causes patient. Patient remains in A Fib today and patient to call Dr Valles and discuss medication options.  Patient denies any chest pain, palpations, or increased shortness of breath. Hypertension controlled with Amlodipine-vaslartan 5-160 mg daily.     Patient Care " Team:  Teddy Bui MD as PCP - General (Internal Medicine)  Mendy, Chandan ROCKWELL MD as Consulting Physician (Urology)  Yoshi Valles MD as Consulting Physician (Cardiology)  Chester, Db Andrade MD (Vascular Surgery)    REVIEW OF SYSTEMS     Review of Systems   Constitutional: Negative.    HENT: Negative.    Respiratory: Negative.  Negative for shortness of breath.    Cardiovascular: Negative.  Negative for chest pain and palpitations.   Gastrointestinal: Negative.    Skin: Negative.    Neurological: Positive for dizziness. Negative for headaches.   Psychiatric/Behavioral: Negative.           PHYSICAL EXAMINATION     Physical Exam  Cardiovascular:      Rate and Rhythm: Bradycardia present. Rhythm irregular.      Pulses: Normal pulses.      Heart sounds: Normal heart sounds.   Pulmonary:      Effort: Pulmonary effort is normal.      Breath sounds: Normal breath sounds.   Musculoskeletal:         General: Normal range of motion.   Skin:     General: Skin is warm and dry.   Neurological:      Mental Status: He is alert. Mental status is at baseline.             REVIEWED DATA     Labs:     Lab Results   Component Value Date     06/07/2022    K 4.3 06/07/2022    CALCIUM 9.0 06/07/2022    AST 17 06/07/2022    ALT 11 06/07/2022    BUN 12 06/07/2022    CREATININE 1.08 06/07/2022    CREATININE 0.94 12/02/2021    CREATININE 0.87 02/24/2021    EGFRIFNONA 82 12/02/2021    EGFRIFAFRI 95 12/02/2021       No results found for: HGBA1C    Lab Results   Component Value Date    LDL 76 06/07/2022    LDL 85 02/24/2021    LDL 69 02/18/2020    HDL 31 (L) 06/07/2022    HDL 39 (L) 02/24/2021    TRIG 170 (H) 06/07/2022    TRIG 100 02/24/2021       Lab Results   Component Value Date    TSH 1.540 02/18/2020    FREET4 1.09 02/18/2020       Lab Results   Component Value Date    WBC 4.11 02/18/2020    HGB 14.8 02/18/2020     02/18/2020       No results found for: MALBCRERATIO       Imaging:           Medical Tests:            Summary of old records / correspondence / consultant report:           Request outside records:           MONA Reagan

## 2022-12-27 NOTE — ASSESSMENT & PLAN NOTE
Patient recently seen by Dr Valles and remains in A Fib. Patient instructed to resume the Metoprolol 100 mg daily and ask Dr Valles if can change to not long-acting Metoprolol to 50 mg bid instead secondary to acute dizziness

## 2022-12-29 ENCOUNTER — TELEPHONE (OUTPATIENT)
Dept: INTERNAL MEDICINE | Age: 72
End: 2022-12-29
Payer: COMMERCIAL

## 2022-12-29 NOTE — TELEPHONE ENCOUNTER
Wally santos stating dr montilla wanted to see him before 4/4/23 , he would like a call in regards.

## 2023-03-29 ENCOUNTER — TELEPHONE (OUTPATIENT)
Dept: INTERNAL MEDICINE | Age: 73
End: 2023-03-29

## 2023-03-29 NOTE — TELEPHONE ENCOUNTER
Caller: Chapincito Nguyen    Relationship: Self    Best call back number: [phone]    What was the call regarding: PATIENT HAS BEEN SEEING HIS CARDIOLOGIST, DR SHAHID, AND HIS A-FIB IS UNDER CONTROL AND HE IS DOING GOOD. DOES DR MORSE STILL WANT TO SEE THE PATIENT NEXT WEEK OR IS HE OKAY WITH HIM CANCELING THE APPT?    Do you require a callback: YES

## 2023-04-04 ENCOUNTER — OFFICE VISIT (OUTPATIENT)
Dept: INTERNAL MEDICINE | Age: 73
End: 2023-04-04
Payer: MEDICARE

## 2023-04-04 VITALS
WEIGHT: 179 LBS | OXYGEN SATURATION: 97 % | TEMPERATURE: 97.5 F | HEIGHT: 72 IN | DIASTOLIC BLOOD PRESSURE: 90 MMHG | SYSTOLIC BLOOD PRESSURE: 150 MMHG | HEART RATE: 62 BPM | BODY MASS INDEX: 24.24 KG/M2

## 2023-04-04 DIAGNOSIS — I48.0 PAROXYSMAL ATRIAL FIBRILLATION: Primary | Chronic | ICD-10-CM

## 2023-04-04 DIAGNOSIS — E78.6 LOW HDL (UNDER 40): Chronic | ICD-10-CM

## 2023-04-04 DIAGNOSIS — I10 PRIMARY HYPERTENSION: Chronic | ICD-10-CM

## 2023-04-04 LAB
ALBUMIN SERPL-MCNC: 4.5 G/DL (ref 3.5–5.2)
ALBUMIN/GLOB SERPL: 1.6 G/DL
ALP SERPL-CCNC: 85 U/L (ref 39–117)
ALT SERPL-CCNC: 17 U/L (ref 1–41)
AST SERPL-CCNC: 17 U/L (ref 1–40)
BASOPHILS # BLD AUTO: 0.05 10*3/MM3 (ref 0–0.2)
BASOPHILS NFR BLD AUTO: 1.1 % (ref 0–1.5)
BILIRUB SERPL-MCNC: 0.5 MG/DL (ref 0–1.2)
BUN SERPL-MCNC: 12 MG/DL (ref 8–23)
BUN/CREAT SERPL: 11.4 (ref 7–25)
CALCIUM SERPL-MCNC: 9.7 MG/DL (ref 8.6–10.5)
CHLORIDE SERPL-SCNC: 102 MMOL/L (ref 98–107)
CHOLEST SERPL-MCNC: 153 MG/DL (ref 0–200)
CHOLEST/HDLC SERPL: 3.73 {RATIO}
CO2 SERPL-SCNC: 31.8 MMOL/L (ref 22–29)
CREAT SERPL-MCNC: 1.05 MG/DL (ref 0.76–1.27)
EGFRCR SERPLBLD CKD-EPI 2021: 75.9 ML/MIN/1.73
EOSINOPHIL # BLD AUTO: 0.04 10*3/MM3 (ref 0–0.4)
EOSINOPHIL NFR BLD AUTO: 0.9 % (ref 0.3–6.2)
ERYTHROCYTE [DISTWIDTH] IN BLOOD BY AUTOMATED COUNT: 13.7 % (ref 12.3–15.4)
GLOBULIN SER CALC-MCNC: 2.9 GM/DL
GLUCOSE SERPL-MCNC: 104 MG/DL (ref 65–99)
HCT VFR BLD AUTO: 47.2 % (ref 37.5–51)
HDLC SERPL-MCNC: 41 MG/DL (ref 40–60)
HGB BLD-MCNC: 15.7 G/DL (ref 13–17.7)
IMM GRANULOCYTES # BLD AUTO: 0.01 10*3/MM3 (ref 0–0.05)
IMM GRANULOCYTES NFR BLD AUTO: 0.2 % (ref 0–0.5)
LDLC SERPL CALC-MCNC: 93 MG/DL (ref 0–100)
LYMPHOCYTES # BLD AUTO: 0.88 10*3/MM3 (ref 0.7–3.1)
LYMPHOCYTES NFR BLD AUTO: 20.2 % (ref 19.6–45.3)
MCH RBC QN AUTO: 30.3 PG (ref 26.6–33)
MCHC RBC AUTO-ENTMCNC: 33.3 G/DL (ref 31.5–35.7)
MCV RBC AUTO: 90.9 FL (ref 79–97)
MONOCYTES # BLD AUTO: 0.45 10*3/MM3 (ref 0.1–0.9)
MONOCYTES NFR BLD AUTO: 10.3 % (ref 5–12)
NEUTROPHILS # BLD AUTO: 2.92 10*3/MM3 (ref 1.7–7)
NEUTROPHILS NFR BLD AUTO: 67.3 % (ref 42.7–76)
NRBC BLD AUTO-RTO: 0 /100 WBC (ref 0–0.2)
PLATELET # BLD AUTO: 204 10*3/MM3 (ref 140–450)
POTASSIUM SERPL-SCNC: 4.3 MMOL/L (ref 3.5–5.2)
PROT SERPL-MCNC: 7.4 G/DL (ref 6–8.5)
RBC # BLD AUTO: 5.19 10*6/MM3 (ref 4.14–5.8)
SODIUM SERPL-SCNC: 140 MMOL/L (ref 136–145)
T4 FREE SERPL-MCNC: 1.16 NG/DL (ref 0.93–1.7)
TRIGL SERPL-MCNC: 101 MG/DL (ref 0–150)
TSH SERPL DL<=0.005 MIU/L-ACNC: 1.04 UIU/ML (ref 0.27–4.2)
VLDLC SERPL CALC-MCNC: 19 MG/DL (ref 5–40)
WBC # BLD AUTO: 4.35 10*3/MM3 (ref 3.4–10.8)

## 2023-04-04 PROCEDURE — 99214 OFFICE O/P EST MOD 30 MIN: CPT | Performed by: INTERNAL MEDICINE

## 2023-04-04 RX ORDER — PROPAFENONE HYDROCHLORIDE 150 MG/1
150 TABLET, COATED ORAL 2 TIMES DAILY
COMMUNITY
Start: 2023-03-20

## 2023-04-04 NOTE — PATIENT INSTRUCTIONS
** IMPORTANT MESSAGE FROM DR. MORSE **    In our office, your satisfaction is VERY important to us.     You may receive a survey from [facility] by mail or E-mail for you to provide feedback about your visit. This information is invaluable for me to know what we can do to improve our services.     I ask that you please take a few minutes to complete the survey and let us know how we are doing in serving your needs. (You may receive the survey more than once for multiple visits)    Thank You !    Dr. Morse    _________________________________________________________________________________________________________________________      ** ADDITIONAL INSTRUCTION / REMINDERS FROM DR. MORSE **

## 2023-04-04 NOTE — ASSESSMENT & PLAN NOTE
S/p CV and taking propafenone and currently in sinus.   Continue propafenone 150 mg twice daily, metoprolol succinate 50 mg daily and Eliquis 5 mg twice daily.  Continue follow-up with cardiology.

## 2023-04-04 NOTE — PROGRESS NOTES
I N T E R N A L  M E D I C I N E    J U N O H  K I M,  M D      ENCOUNTER DATE:  04/04/2023    Chapincito Nguyen / 72 y.o. / male    CHIEF COMPLAINT / REASON FOR OFFICE VISIT     Hypertension and New onset a-fib       ASSESSMENT & PLAN     Problem List Items Addressed This Visit        High    Paroxysmal atrial fibrillation - Primary (Chronic)    Current Assessment & Plan     S/p CV and taking propafenone and currently in sinus.   Continue propafenone 150 mg twice daily, metoprolol succinate 50 mg daily and Eliquis 5 mg twice daily.  Continue follow-up with cardiology.         Relevant Medications    sildenafil (VIAGRA) 100 MG tablet    metoprolol succinate XL (TOPROL-XL) 50 MG 24 hr tablet    Other Relevant Orders    TSH+Free T4    CBC & Differential       Medium    Hypertension (Chronic)    Current Assessment & Plan     BP Readings from Last 3 Encounters:   04/04/23 150/90   12/27/22 116/68   12/13/22 124/64     Blood pressure is elevated here today but reportedly less than 130/80 at home on a consistent basis.  Continue amlodipine-valsartan 5-160 mg daily and metoprolol succinate 50 mg daily.  Continue to monitor blood pressure at home.         Relevant Medications    amLODIPine-valsartan (EXFORGE) 5-160 MG per tablet    metoprolol succinate XL (TOPROL-XL) 50 MG 24 hr tablet    Low HDL (under 40) (Chronic)    Current Assessment & Plan     Check lipid panel today.         Relevant Orders    Comprehensive Metabolic Panel    Lipid Panel With / Chol / HDL Ratio     Orders Placed This Encounter   Procedures   • Comprehensive Metabolic Panel   • Lipid Panel With / Chol / HDL Ratio   • TSH+Free T4   • CBC & Differential     No orders of the defined types were placed in this encounter.      SUMMARY/DISCUSSION  •       Next Appointment with me: Visit date not found    Return in about 4 months (around 8/4/2023) for Reassess chronic medical problems.      VITAL SIGNS     Vitals:    04/04/23 1023   BP: 150/90   Pulse: 62  "  Temp: 97.5 °F (36.4 °C)   SpO2: 97%   Weight: 81.2 kg (179 lb)   Height: 182.9 cm (72.01\")       BP Readings from Last 3 Encounters:   04/04/23 150/90   12/27/22 116/68   12/13/22 124/64     Wt Readings from Last 3 Encounters:   04/04/23 81.2 kg (179 lb)   12/27/22 84 kg (185 lb 3.2 oz)   12/13/22 83.5 kg (184 lb)     Body mass index is 24.27 kg/m².    Blood pressure readings recorded on patient flowsheet:  No flowsheet data found.       MEDICATIONS AT THE TIME OF OFFICE VISIT     Current Outpatient Medications on File Prior to Visit   Medication Sig   • amLODIPine-valsartan (EXFORGE) 5-160 MG per tablet TAKE 1 TABLET EVERY DAY   • Eliquis 5 MG tablet tablet    • metoprolol succinate XL (TOPROL-XL) 50 MG 24 hr tablet Take 1 tablet by mouth Daily.   • sildenafil (VIAGRA) 100 MG tablet Take 1 tablet by mouth Daily.   • propafenone (RYTHMOL) 150 MG tablet Take 1 tablet by mouth 2 (Two) Times a Day.     No current facility-administered medications on file prior to visit.          HISTORY OF PRESENT ILLNESS     Patient underwent cardioversion and is on propafenone and metoprolol succinate 50 mg daily along with Eliquis 5 mg twice daily.  Patient denies increased heart palpitation, chest pain or dyspnea on exertion.  He denies any bruising, melena or blood in the stool or unusual headaches.  Blood pressure is reportedly less than 130/80 at home on amlodipine/valsartan and metoprolol succinate 50 mg.  He has history of bicuspid aortic valve without significant stenosis.  He has low HDL of 39.  Patient states he has fasted for labs today.      Patient Care Team:  Teddy Bui MD as PCP - General (Internal Medicine)  Chandan Brooke MD as Consulting Physician (Urology)  Yoshi Valles MD as Consulting Physician (Cardiology)  Chester, Db Andrade MD (Vascular Surgery)    REVIEW OF SYSTEMS     Review of Systems       PHYSICAL EXAMINATION     Physical Exam  General: No acute distress  Psych: Normal thought " and judgment   Cardiovascular Rate: normal. Rhythm: regular. Heart sounds: normal       REVIEWED DATA     Labs:     Lab Results   Component Value Date     06/07/2022    K 4.3 06/07/2022    CALCIUM 9.0 06/07/2022    AST 17 06/07/2022    ALT 11 06/07/2022    BUN 12 06/07/2022    CREATININE 1.08 06/07/2022    CREATININE 0.94 12/02/2021    CREATININE 0.87 02/24/2021    EGFRIFNONA 82 12/02/2021    EGFRIFAFRI 95 12/02/2021     No results found for: HGBA1C    Lab Results   Component Value Date    LDL 76 06/07/2022    LDL 85 02/24/2021    LDL 69 02/18/2020    HDL 31 (L) 06/07/2022    HDL 39 (L) 02/24/2021    TRIG 170 (H) 06/07/2022    TRIG 100 02/24/2021       Lab Results   Component Value Date    TSH 1.540 02/18/2020    FREET4 1.09 02/18/2020       Lab Results   Component Value Date    WBC 6.22 12/14/2022    HGB 15.3 12/14/2022     12/14/2022       No results found for: MALBCRERATIO       Imaging:           Medical Tests:     Echocardiogram 1/31/2022:     Normal left ventricular ejection fraction of 60 to 65%, mild left atrial enlargement, borderline right ventricular enlargement, bicuspid aortic valve without aortic stenosis or regurgitation, moderate dilation of the ascending aorta of 4.4 to 4.6 cm      Summary of old records / correspondence / consultant report:           Request outside records:             *Examiner was wearing KN95 mask during the entire duration of the visit. Patient was masked the entire time. Minimum social distance of 6 ft maintained entire visit except if physical contact was necessary as documented.       Template created by Brennan Bui MD

## 2023-04-04 NOTE — ASSESSMENT & PLAN NOTE
BP Readings from Last 3 Encounters:   04/04/23 150/90   12/27/22 116/68   12/13/22 124/64     Blood pressure is elevated here today but reportedly less than 130/80 at home on a consistent basis.  Continue amlodipine-valsartan 5-160 mg daily and metoprolol succinate 50 mg daily.  Continue to monitor blood pressure at home.

## 2023-08-15 ENCOUNTER — OFFICE VISIT (OUTPATIENT)
Dept: INTERNAL MEDICINE | Age: 73
End: 2023-08-15
Payer: MEDICARE

## 2023-08-15 VITALS
WEIGHT: 177 LBS | OXYGEN SATURATION: 98 % | SYSTOLIC BLOOD PRESSURE: 122 MMHG | BODY MASS INDEX: 23.98 KG/M2 | HEIGHT: 72 IN | HEART RATE: 65 BPM | TEMPERATURE: 97.1 F | DIASTOLIC BLOOD PRESSURE: 74 MMHG

## 2023-08-15 DIAGNOSIS — I48.0 PAROXYSMAL ATRIAL FIBRILLATION: Primary | Chronic | ICD-10-CM

## 2023-08-15 DIAGNOSIS — I10 PRIMARY HYPERTENSION: Chronic | ICD-10-CM

## 2023-08-15 PROCEDURE — 3078F DIAST BP <80 MM HG: CPT | Performed by: INTERNAL MEDICINE

## 2023-08-15 PROCEDURE — 3074F SYST BP LT 130 MM HG: CPT | Performed by: INTERNAL MEDICINE

## 2023-08-15 PROCEDURE — 99214 OFFICE O/P EST MOD 30 MIN: CPT | Performed by: INTERNAL MEDICINE

## 2023-08-15 RX ORDER — AMLODIPINE AND VALSARTAN 10; 320 MG/1; MG/1
1 TABLET ORAL DAILY
COMMUNITY
Start: 2023-06-29

## 2023-08-15 NOTE — PROGRESS NOTES
"    I N T E R N A L  M E D I C I N E    J U N O H  K I M,  M D      ENCOUNTER DATE:  08/15/2023    Chapincito Nguyen / 72 y.o. / male    CHIEF COMPLAINT / REASON FOR OFFICE VISIT     Hypertension and Paroxysmal atrial fibrillation      ASSESSMENT & PLAN     Problem List Items Addressed This Visit          High    Hypertension (Chronic)    Current Assessment & Plan     Blood pressure is improved with increased dose of amlodipine/valsartan to 10/320 mg by cardiologist. Continue metoprolol succinate 50 mg.     BP Readings from Last 3 Encounters:   08/15/23 122/74   04/04/23 150/90   12/27/22 116/68             Relevant Medications    metoprolol succinate XL (TOPROL-XL) 50 MG 24 hr tablet    amLODIPine-valsartan (EXFORGE)  MG per tablet       Medium    Paroxysmal atrial fibrillation - Primary (Chronic)    Overview     Continue follow-up with cardiology    Continue Eliquis and metoprolol succinate         Relevant Medications    sildenafil (VIAGRA) 100 MG tablet    metoprolol succinate XL (TOPROL-XL) 50 MG 24 hr tablet     No orders of the defined types were placed in this encounter.    No orders of the defined types were placed in this encounter.      SUMMARY/DISCUSSION        Next Appointment with me: Visit date not found    Return in about 6 months (around 2/15/2024) for Reassess chronic medical problems.      VITAL SIGNS     Vitals:    08/15/23 1324   BP: 122/74   Pulse: 65   Temp: 97.1 øF (36.2 øC)   SpO2: 98%   Weight: 80.3 kg (177 lb)   Height: 182.9 cm (72.01\")       BP Readings from Last 3 Encounters:   08/15/23 122/74   04/04/23 150/90   12/27/22 116/68     Wt Readings from Last 3 Encounters:   08/15/23 80.3 kg (177 lb)   04/04/23 81.2 kg (179 lb)   12/27/22 84 kg (185 lb 3.2 oz)     Body mass index is 24 kg/mý.    Blood pressure readings recorded on patient flowsheet:       No data to display                  MEDICATIONS AT THE TIME OF OFFICE VISIT     Current Outpatient Medications on File Prior to Visit "   Medication Sig    amLODIPine-valsartan (EXFORGE)  MG per tablet Take 1 tablet by mouth Daily.    Eliquis 5 MG tablet tablet Take 1 tablet by mouth 2 (Two) Times a Day.    metoprolol succinate XL (TOPROL-XL) 50 MG 24 hr tablet Take 1 tablet by mouth Daily.    propafenone (RYTHMOL) 150 MG tablet Take 1 tablet by mouth 2 (Two) Times a Day.    sildenafil (VIAGRA) 100 MG tablet Take 1 tablet by mouth Daily.       HISTORY OF PRESENT ILLNESS     His cardiologist increased the dose of amlodipine-valsartan to  mg and blood pressure appears to be better controlled.  He denies significant side effects.  He remains on propafenone and Eliquis for atrial fibrillation and denies chest pain, RICHARDS or palpitations.  Denies any significant bleeding complications from Eliquis.  Denies any acute neurological changes.      Patient Care Team:  Teddy Bui MD as PCP - General (Internal Medicine)  Chandan Brooke MD as Consulting Physician (Urology)  Yoshi Valles MD as Consulting Physician (Cardiology)  Db Briggs MD (Vascular Surgery)    REVIEW OF SYSTEMS     Review of Systems       PHYSICAL EXAMINATION     Physical Exam  General: No acute distress  Psych: Normal thought and judgment   Cardiovascular Rate: normal. Rhythm: regular. Heart sounds: normal  Pulmonary/Chest: Effort normal and breath sounds normal.   1+ bilateral lower extremity pitting edema      REVIEWED DATA     Labs:     Lab Results   Component Value Date     04/04/2023    K 4.3 04/04/2023    CALCIUM 9.7 04/04/2023    AST 17 04/04/2023    ALT 17 04/04/2023    BUN 12 04/04/2023    CREATININE 1.05 04/04/2023    CREATININE 1.08 06/07/2022    CREATININE 0.94 12/02/2021    EGFRIFNONA 82 12/02/2021    EGFRIFAFRI 95 12/02/2021       No results found for: HGBA1C    Lab Results   Component Value Date    LDL 93 04/04/2023    LDL 76 06/07/2022    LDL 85 02/24/2021    HDL 41 04/04/2023    HDL 31 (L) 06/07/2022    TRIG 101 04/04/2023     TRIG 170 (H) 06/07/2022       Lab Results   Component Value Date    TSH 1.040 04/04/2023    TSH 1.540 02/18/2020    FREET4 1.16 04/04/2023    FREET4 1.09 02/18/2020       Lab Results   Component Value Date    WBC 4.35 04/04/2023    HGB 15.7 04/04/2023     04/04/2023       No results found for: MALBCRERATIO         Imaging:           Medical Tests:           Summary of old records / correspondence / consultant report:           Request outside records:

## 2023-08-15 NOTE — ASSESSMENT & PLAN NOTE
Blood pressure is improved with increased dose of amlodipine/valsartan to 10/320 mg by cardiologist. Continue metoprolol succinate 50 mg.     BP Readings from Last 3 Encounters:   08/15/23 122/74   04/04/23 150/90   12/27/22 116/68

## 2023-11-08 ENCOUNTER — TRANSCRIBE ORDERS (OUTPATIENT)
Dept: LAB | Facility: HOSPITAL | Age: 73
End: 2023-11-08
Payer: MEDICARE

## 2023-11-08 ENCOUNTER — HOSPITAL ENCOUNTER (OUTPATIENT)
Dept: CARDIOLOGY | Facility: HOSPITAL | Age: 73
Discharge: HOME | End: 2023-11-08
Payer: MEDICARE

## 2023-11-08 ENCOUNTER — LAB (OUTPATIENT)
Dept: LAB | Facility: HOSPITAL | Age: 73
End: 2023-11-08
Payer: MEDICARE

## 2023-11-08 DIAGNOSIS — Z01.818 PRE-OP TESTING: Primary | ICD-10-CM

## 2023-11-08 DIAGNOSIS — Z01.818 PRE-OP TESTING: ICD-10-CM

## 2023-11-08 LAB
ANION GAP SERPL CALCULATED.3IONS-SCNC: 11.2 MMOL/L (ref 5–15)
BASOPHILS # BLD AUTO: 0.04 10*3/MM3 (ref 0–0.2)
BASOPHILS NFR BLD AUTO: 0.9 % (ref 0–1.5)
BUN SERPL-MCNC: 14 MG/DL (ref 8–23)
BUN/CREAT SERPL: 14.4 (ref 7–25)
CALCIUM SPEC-SCNC: 8.9 MG/DL (ref 8.6–10.5)
CHLORIDE SERPL-SCNC: 103 MMOL/L (ref 98–107)
CO2 SERPL-SCNC: 26.8 MMOL/L (ref 22–29)
CREAT SERPL-MCNC: 0.97 MG/DL (ref 0.76–1.27)
DEPRECATED RDW RBC AUTO: 43.3 FL (ref 37–54)
EGFRCR SERPLBLD CKD-EPI 2021: 82.9 ML/MIN/1.73
EOSINOPHIL # BLD AUTO: 0.05 10*3/MM3 (ref 0–0.4)
EOSINOPHIL NFR BLD AUTO: 1.1 % (ref 0.3–6.2)
ERYTHROCYTE [DISTWIDTH] IN BLOOD BY AUTOMATED COUNT: 12.9 % (ref 12.3–15.4)
GLUCOSE SERPL-MCNC: 120 MG/DL (ref 65–99)
HCT VFR BLD AUTO: 42.3 % (ref 37.5–51)
HGB BLD-MCNC: 14.1 G/DL (ref 13–17.7)
IMM GRANULOCYTES # BLD AUTO: 0.01 10*3/MM3 (ref 0–0.05)
IMM GRANULOCYTES NFR BLD AUTO: 0.2 % (ref 0–0.5)
LYMPHOCYTES # BLD AUTO: 0.9 10*3/MM3 (ref 0.7–3.1)
LYMPHOCYTES NFR BLD AUTO: 19.5 % (ref 19.6–45.3)
MCH RBC QN AUTO: 30.5 PG (ref 26.6–33)
MCHC RBC AUTO-ENTMCNC: 33.3 G/DL (ref 31.5–35.7)
MCV RBC AUTO: 91.6 FL (ref 79–97)
MONOCYTES # BLD AUTO: 0.38 10*3/MM3 (ref 0.1–0.9)
MONOCYTES NFR BLD AUTO: 8.2 % (ref 5–12)
NEUTROPHILS NFR BLD AUTO: 3.24 10*3/MM3 (ref 1.7–7)
NEUTROPHILS NFR BLD AUTO: 70.1 % (ref 42.7–76)
NRBC BLD AUTO-RTO: 0 /100 WBC (ref 0–0.2)
PLATELET # BLD AUTO: 210 10*3/MM3 (ref 140–450)
PMV BLD AUTO: 9.3 FL (ref 6–12)
POTASSIUM SERPL-SCNC: 4 MMOL/L (ref 3.5–5.2)
QT INTERVAL: 409 MS
QTC INTERVAL: 416 MS
RBC # BLD AUTO: 4.62 10*6/MM3 (ref 4.14–5.8)
SODIUM SERPL-SCNC: 141 MMOL/L (ref 136–145)
WBC NRBC COR # BLD: 4.62 10*3/MM3 (ref 3.4–10.8)

## 2023-11-08 PROCEDURE — 93005 ELECTROCARDIOGRAM TRACING: CPT | Performed by: ANESTHESIOLOGY

## 2023-11-08 PROCEDURE — 80048 BASIC METABOLIC PNL TOTAL CA: CPT

## 2023-11-08 PROCEDURE — 85025 COMPLETE CBC W/AUTO DIFF WBC: CPT

## 2023-11-08 PROCEDURE — 36415 COLL VENOUS BLD VENIPUNCTURE: CPT

## 2023-11-15 LAB
QT INTERVAL: 409 MS
QTC INTERVAL: 416 MS

## 2023-11-20 ENCOUNTER — HOSPITAL ENCOUNTER (OUTPATIENT)
Dept: GENERAL RADIOLOGY | Facility: HOSPITAL | Age: 73
Discharge: HOME | End: 2023-11-20
Admitting: UROLOGY
Payer: MEDICARE

## 2023-11-20 ENCOUNTER — TRANSCRIBE ORDERS (OUTPATIENT)
Dept: GENERAL RADIOLOGY | Facility: HOSPITAL | Age: 73
End: 2023-11-20
Payer: MEDICARE

## 2023-11-20 DIAGNOSIS — N20.0 KIDNEY STONE: Primary | ICD-10-CM

## 2023-11-20 DIAGNOSIS — N20.0 KIDNEY STONE: ICD-10-CM

## 2023-11-20 PROCEDURE — 74018 RADEX ABDOMEN 1 VIEW: CPT

## 2024-02-16 ENCOUNTER — OFFICE VISIT (OUTPATIENT)
Dept: INTERNAL MEDICINE | Age: 74
End: 2024-02-16
Payer: MEDICARE

## 2024-02-16 VITALS
HEART RATE: 60 BPM | DIASTOLIC BLOOD PRESSURE: 70 MMHG | TEMPERATURE: 97.1 F | WEIGHT: 186 LBS | BODY MASS INDEX: 25.19 KG/M2 | HEIGHT: 72 IN | OXYGEN SATURATION: 95 % | SYSTOLIC BLOOD PRESSURE: 108 MMHG

## 2024-02-16 DIAGNOSIS — E78.6 LOW HDL (UNDER 40): Chronic | ICD-10-CM

## 2024-02-16 DIAGNOSIS — Z86.79 STATUS POST THORACIC AORTIC ANEURYSM REPAIR: Chronic | ICD-10-CM

## 2024-02-16 DIAGNOSIS — Z98.890 STATUS POST THORACIC AORTIC ANEURYSM REPAIR: Chronic | ICD-10-CM

## 2024-02-16 DIAGNOSIS — I48.0 PAROXYSMAL ATRIAL FIBRILLATION: Chronic | ICD-10-CM

## 2024-02-16 DIAGNOSIS — C61 PROSTATE CANCER: Chronic | ICD-10-CM

## 2024-02-16 DIAGNOSIS — I10 PRIMARY HYPERTENSION: Primary | Chronic | ICD-10-CM

## 2024-02-16 DIAGNOSIS — Q23.1 BICUSPID AORTIC VALVE: Chronic | ICD-10-CM

## 2024-02-16 DIAGNOSIS — Z12.11 SCREENING FOR COLON CANCER: ICD-10-CM

## 2024-02-16 PROCEDURE — 1160F RVW MEDS BY RX/DR IN RCRD: CPT | Performed by: INTERNAL MEDICINE

## 2024-02-16 PROCEDURE — 3078F DIAST BP <80 MM HG: CPT | Performed by: INTERNAL MEDICINE

## 2024-02-16 PROCEDURE — 3074F SYST BP LT 130 MM HG: CPT | Performed by: INTERNAL MEDICINE

## 2024-02-16 PROCEDURE — 99214 OFFICE O/P EST MOD 30 MIN: CPT | Performed by: INTERNAL MEDICINE

## 2024-02-16 PROCEDURE — G2211 COMPLEX E/M VISIT ADD ON: HCPCS | Performed by: INTERNAL MEDICINE

## 2024-02-16 PROCEDURE — 1159F MED LIST DOCD IN RCRD: CPT | Performed by: INTERNAL MEDICINE

## 2024-02-16 NOTE — ASSESSMENT & PLAN NOTE
Will be establishing with a new cardiologist at [facility] (Dr. Valles is retiring per patient)  On exam today he is in sinus.   Continue Eliquis and rate control.

## 2024-02-16 NOTE — PROGRESS NOTES
I N T E R N A L  M E D I C I N E    J U N O H  K I M,  M D      ENCOUNTER DATE:  02/16/2024    Chapincito Nguyen / 73 y.o. / male    CHIEF COMPLAINT / REASON FOR OFFICE VISIT     Hypertension and  Paroxysmal atrial fibrillation      ASSESSMENT & PLAN     Problem List Items Addressed This Visit          High    Hypertension - Primary (Chronic)    Current Assessment & Plan     BP Readings from Last 3 Encounters:   02/16/24 108/70   08/15/23 122/74   04/04/23 150/90      Blood pressure at times low but asymptomatic. Continue current medications.          Relevant Medications    metoprolol succinate XL (TOPROL-XL) 50 MG 24 hr tablet    amLODIPine-valsartan (EXFORGE)  MG per tablet       Medium    Bicuspid aortic valve (Chronic)    Overview     * sees cardiologist          Relevant Medications    sildenafil (VIAGRA) 100 MG tablet    metoprolol succinate XL (TOPROL-XL) 50 MG 24 hr tablet    Low HDL (under 40) (Chronic)    Relevant Orders    Lipid Panel With / Chol / HDL Ratio    Paroxysmal atrial fibrillation (Chronic)    Overview     Continue follow-up with cardiology    Continue Eliquis and metoprolol succinate         Current Assessment & Plan     Will be establishing with a new cardiologist at [facility] (Dr. Valles is retiring per patient)  On exam today he is in sinus.   Continue Eliquis and rate control.          Relevant Medications    sildenafil (VIAGRA) 100 MG tablet    metoprolol succinate XL (TOPROL-XL) 50 MG 24 hr tablet       Low    Prostate cancer (Chronic)    Overview     Diagnosis May 2017, treated with radiation therapy.    Continue follow-up with urologist             Unprioritized    Status post thoracic aortic aneurysm repair (Chronic)    Overview     S/p endovascular stent graft (2007) followed by vascular surgery and cardiology  Goal blood pressure < 130/80.           Other Visit Diagnoses       Screening for colon cancer        Relevant Orders    Cologuard - Stool, Per Rectum          Orders Placed  "This Encounter   Procedures    Lipid Panel With / Chol / HDL Ratio    Cologuard - Stool, Per Rectum     No orders of the defined types were placed in this encounter.      SUMMARY/DISCUSSION        Next Appointment with me: Visit date not found    Return in about 6 months (around 8/16/2024) for **SCHEDULE COMBINED AWV AND MEDICAL F/U**.      VITAL SIGNS     Vitals:    02/16/24 1007   BP: 108/70   Pulse: 60   Temp: 97.1 °F (36.2 °C)   SpO2: 95%   Weight: 84.4 kg (186 lb)   Height: 182.9 cm (72.01\")       BP Readings from Last 3 Encounters:   02/16/24 108/70   08/15/23 122/74   04/04/23 150/90     Wt Readings from Last 3 Encounters:   02/16/24 84.4 kg (186 lb)   08/15/23 80.3 kg (177 lb)   04/04/23 81.2 kg (179 lb)     Body mass index is 25.22 kg/m².    Blood pressure readings recorded on patient flowsheet:       No data to display                  MEDICATIONS AT THE TIME OF OFFICE VISIT     Current Outpatient Medications on File Prior to Visit   Medication Sig    amLODIPine-valsartan (EXFORGE)  MG per tablet Take 1 tablet by mouth Daily.    Eliquis 5 MG tablet tablet Take 1 tablet by mouth 2 (Two) Times a Day.    metoprolol succinate XL (TOPROL-XL) 50 MG 24 hr tablet Take 1 tablet by mouth Daily.    propafenone (RYTHMOL) 150 MG tablet Take 1 tablet by mouth 2 (Two) Times a Day.    sildenafil (VIAGRA) 100 MG tablet Take 1 tablet by mouth Daily.     No current facility-administered medications on file prior to visit.          HISTORY OF PRESENT ILLNESS     The patient presents for evaluation of hypertension and paroxysmal atrial fibrillation.    He had a renal calculus and required a lithotripsy procedure that he tolerated well. He sees a urologist for his history of prostate cancer. His PSA is within normal limits. He has an appointment with urology in the next 1 to 2 months.    The patient is not monitoring his blood pressure regularly at home. At his last visit with Dr. Valles, his blood pressure was 110/58 " mmHg. He denies lightheadedness or dizziness with systolic readings of 105 mmHg. He experiences occasional episodes of vertigo but denies dizziness on standing. He is taking amlodipine and valsartan for blood pressure control and metoprolol for blood pressure control and AFib. He discussed his medications with Dr. Valles, and the decision was made to maintain his current regimen to avoid added pressure on his aneurysm. Dr. Valles will be retiring, and the patient will likely follow up with a cardiology nurse practitioner in 6 months.    The patient has noticed that he has been feeling cold frequently this winter. He is taking Eliquis and questions if this is a contributing factor; however, his wife likes to keep the house cool. His thyroid testing in 04/2023 was within normal limits, and his CBC in 11/2023 was not significant for anemia.    His most recent colonoscopy was performed in 2014. He denies a family history of colon cancer and has never had polyps on colonoscopy. He wishes to proceed with Cologuard.     He has a living will and denies recent falls.       Patient Care Team:  Teddy Bui MD as PCP - General (Internal Medicine)  Chandan Brooke MD as Consulting Physician (Urology)  Yoshi Valles MD as Consulting Physician (Cardiology)  Db Briggs MD (Vascular Surgery)    REVIEW OF SYSTEMS     Review of Systems       PHYSICAL EXAMINATION     Physical Exam  General: No acute distress  Psych: Normal thought and judgment   Cardiovascular Rate: normal. Rhythm: regular. Heart sounds: normal   Pulm/Chest: Effort normal, breath sounds normal.   No lower extremity edema   No carotid bruit       REVIEWED DATA     Labs:     Lab Results   Component Value Date     11/08/2023    K 4.0 11/08/2023    CALCIUM 8.9 11/08/2023    AST 17 04/04/2023    ALT 17 04/04/2023    BUN 14 11/08/2023    CREATININE 0.97 11/08/2023    CREATININE 1.05 04/04/2023    CREATININE 0.83 01/10/2023    EGFRIFNONA 82  "12/02/2021    EGFRIFAFRI >60 01/10/2023       No results found for: \"HGBA1C\"      Lab Results   Component Value Date    LDL 93 04/04/2023    LDL 76 06/07/2022    LDL 85 02/24/2021    HDL 41 04/04/2023    HDL 31 (L) 06/07/2022    TRIG 101 04/04/2023    TRIG 170 (H) 06/07/2022       Lab Results   Component Value Date    TSH 1.040 04/04/2023    TSH 1.540 02/18/2020    FREET4 1.16 04/04/2023    FREET4 1.09 02/18/2020       Lab Results   Component Value Date    WBC 4.62 11/08/2023    HGB 14.1 11/08/2023     11/08/2023       No results found for: \"MALBCRERATIO\"         Imaging:           Medical Tests:           Summary of old records / correspondence / consultant report:     Cardiology office note 2/6/24 Dr. Reena angel for atrial fibrillation and hypertension       Request outside records:         Transcribed from ambient dictation for Teddy Bui MD by Elaine Mccracken.  02/16/24   14:06 EST    Patient or patient representative verbalized consent to the visit recording.  I have personally performed the services described in this document as transcribed by the above individual, and it is both accurate and complete.  Teddy Bui MD  2/19/2024  07:42 EST      "

## 2024-02-16 NOTE — ASSESSMENT & PLAN NOTE
BP Readings from Last 3 Encounters:   02/16/24 108/70   08/15/23 122/74   04/04/23 150/90      Blood pressure at times low but asymptomatic. Continue current medications.

## 2024-02-17 LAB
CHOLEST SERPL-MCNC: 153 MG/DL (ref 100–199)
CHOLEST/HDLC SERPL: 3.7 RATIO (ref 0–5)
HDLC SERPL-MCNC: 41 MG/DL
LDLC SERPL CALC-MCNC: 93 MG/DL (ref 0–99)
TRIGL SERPL-MCNC: 102 MG/DL (ref 0–149)
VLDLC SERPL CALC-MCNC: 19 MG/DL (ref 5–40)

## 2024-03-14 DIAGNOSIS — R19.5 POSITIVE COLORECTAL CANCER SCREENING USING COLOGUARD TEST: Primary | ICD-10-CM

## 2024-03-29 ENCOUNTER — TELEPHONE (OUTPATIENT)
Dept: GASTROENTEROLOGY | Facility: CLINIC | Age: 74
End: 2024-03-29
Payer: MEDICARE

## 2024-03-29 NOTE — TELEPHONE ENCOUNTER
POSITIVE COLOGUARD    NO RECORD OF C/S    NO PERSONAL HX OF POLYPS    NO FAMILY HX OF POLYPS    NO FAMILY HX OF COLON CA            LIST OF  MEDICATIONS  METOPROLOL  AMLODIPINE  PROPAFENONE  SILDENAFIL  ELIQUIS                OA QUESTIONNAIRE SCANNED IN MEDIA

## 2024-04-07 DIAGNOSIS — R19.5 POSITIVE COLORECTAL CANCER SCREENING USING COLOGUARD TEST: Primary | ICD-10-CM

## 2024-04-07 RX ORDER — SODIUM CHLORIDE, SODIUM LACTATE, POTASSIUM CHLORIDE, CALCIUM CHLORIDE 600; 310; 30; 20 MG/100ML; MG/100ML; MG/100ML; MG/100ML
30 INJECTION, SOLUTION INTRAVENOUS CONTINUOUS
OUTPATIENT
Start: 2024-04-07

## 2024-04-12 ENCOUNTER — TELEPHONE (OUTPATIENT)
Dept: GASTROENTEROLOGY | Facility: CLINIC | Age: 74
End: 2024-04-12
Payer: MEDICARE

## 2024-04-12 PROBLEM — R19.5 POSITIVE COLORECTAL CANCER SCREENING USING COLOGUARD TEST: Status: ACTIVE | Noted: 2024-04-07

## 2024-04-12 NOTE — TELEPHONE ENCOUNTER
Email sent to josse  for COLONOSCOPY on 5/14/2024  arrive at  1:00 . Sent prep instructions to pt my chart....miralax

## 2024-05-13 NOTE — SIGNIFICANT NOTE
Education provided the Patient on the following:    - Nothing to Eat or Drink after MN the night before the procedure    - Avoid red/purple fluids while completing their bowel prep as ordered by physician  -Contact Gastrointerologist office for any questions about specific details regarding colon prep    -You will need to have someone drive you home after your colonoscopy and remain with you for 24 hours after the procedure  - The date of your Surgery, you may have one visitor at bedside or within 10-15 minutes of [place]  -Please wear warm socks when you arrive for your colonoscopy  -Remove all jewelry and leave any valuables before arriving the day of your procedure (all will have to be removed before leaving preop)  -You will need to arrive at 1200 on 5/14 for your colonoscopy    -Feel free to contact us at: [phone] with any additional questions/concerns

## 2024-05-14 ENCOUNTER — HOSPITAL ENCOUNTER (OUTPATIENT)
Facility: SURGERY CENTER | Age: 74
Setting detail: HOSPITAL OUTPATIENT SURGERY
Discharge: HOME | End: 2024-05-14
Attending: INTERNAL MEDICINE | Admitting: INTERNAL MEDICINE
Payer: MEDICARE

## 2024-05-14 ENCOUNTER — ANESTHESIA (OUTPATIENT)
Dept: SURGERY | Facility: SURGERY CENTER | Age: 74
End: 2024-05-14
Payer: MEDICARE

## 2024-05-14 ENCOUNTER — ANESTHESIA EVENT (OUTPATIENT)
Dept: SURGERY | Facility: SURGERY CENTER | Age: 74
End: 2024-05-14
Payer: MEDICARE

## 2024-05-14 VITALS
BODY MASS INDEX: 24.49 KG/M2 | TEMPERATURE: 98.2 F | DIASTOLIC BLOOD PRESSURE: 71 MMHG | HEIGHT: 72 IN | SYSTOLIC BLOOD PRESSURE: 108 MMHG | HEART RATE: 60 BPM | WEIGHT: 180.8 LBS | OXYGEN SATURATION: 96 % | RESPIRATION RATE: 16 BRPM

## 2024-05-14 DIAGNOSIS — R19.5 POSITIVE COLORECTAL CANCER SCREENING USING COLOGUARD TEST: ICD-10-CM

## 2024-05-14 PROCEDURE — G0121 COLON CA SCRN NOT HI RSK IND: HCPCS | Performed by: INTERNAL MEDICINE

## 2024-05-14 PROCEDURE — 25010000002 PROPOFOL 1000 MG/100ML EMULSION: Performed by: NURSE ANESTHETIST, CERTIFIED REGISTERED

## 2024-05-14 PROCEDURE — 25810000003 LACTATED RINGERS PER 1000 ML: Performed by: INTERNAL MEDICINE

## 2024-05-14 PROCEDURE — 25010000002 LIDOCAINE 1 % SOLUTION: Performed by: NURSE ANESTHETIST, CERTIFIED REGISTERED

## 2024-05-14 PROCEDURE — 25010000002 PROPOFOL 10 MG/ML EMULSION: Performed by: NURSE ANESTHETIST, CERTIFIED REGISTERED

## 2024-05-14 RX ORDER — LIDOCAINE HYDROCHLORIDE 10 MG/ML
INJECTION, SOLUTION INFILTRATION; PERINEURAL AS NEEDED
Status: DISCONTINUED | OUTPATIENT
Start: 2024-05-14 | End: 2024-05-14 | Stop reason: SURG

## 2024-05-14 RX ORDER — PROPOFOL 10 MG/ML
INJECTION, EMULSION INTRAVENOUS AS NEEDED
Status: DISCONTINUED | OUTPATIENT
Start: 2024-05-14 | End: 2024-05-14 | Stop reason: SURG

## 2024-05-14 RX ORDER — SODIUM CHLORIDE, SODIUM LACTATE, POTASSIUM CHLORIDE, CALCIUM CHLORIDE 600; 310; 30; 20 MG/100ML; MG/100ML; MG/100ML; MG/100ML
30 INJECTION, SOLUTION INTRAVENOUS CONTINUOUS
Status: DISCONTINUED | OUTPATIENT
Start: 2024-05-14 | End: 2024-05-14 | Stop reason: HOSPADM

## 2024-05-14 RX ADMIN — PROPOFOL 70 MG: 10 INJECTION, EMULSION INTRAVENOUS at 12:43

## 2024-05-14 RX ADMIN — SODIUM CHLORIDE, POTASSIUM CHLORIDE, SODIUM LACTATE AND CALCIUM CHLORIDE 30 ML/HR: 600; 310; 30; 20 INJECTION, SOLUTION INTRAVENOUS at 12:14

## 2024-05-14 RX ADMIN — PROPOFOL 180 MCG/KG/MIN: 10 INJECTION, EMULSION INTRAVENOUS at 12:43

## 2024-05-14 RX ADMIN — LIDOCAINE HYDROCHLORIDE 30 MG: 10 INJECTION, SOLUTION INFILTRATION; PERINEURAL at 12:43

## 2024-05-14 NOTE — ANESTHESIA POSTPROCEDURE EVALUATION
Patient: Chapincito Nguyen    Procedure Summary       Date: 05/14/24 Room / Location: [facility]    Anesthesia Start: 1238 Anesthesia Stop: 1307    Procedure: COLONOSCOPY WITH BIOPSY/POLYPECTOMY Diagnosis:       Positive colorectal cancer screening using Cologuard test      (Positive colorectal cancer screening using Cologuard test [R19.5])    Surgeons: Gerardo Ross MD Provider: Reynaldo Cruz MD    Anesthesia Type: MAC ASA Status: 3            Anesthesia Type: MAC    Vitals  Vitals Value Taken Time   /71 05/14/24 1330   Temp 36.8 °C (98.2 °F) 05/14/24 1305   Pulse 60 05/14/24 1330   Resp 16 05/14/24 1330   SpO2 96 % 05/14/24 1330           Post Anesthesia Care and Evaluation    Patient location during evaluation: PACU  Patient participation: complete - patient participated  Level of consciousness: awake  Pain management: adequate    Airway patency: patent  Anesthetic complications: No anesthetic complications  PONV Status: none  Cardiovascular status: acceptable  Respiratory status: acceptable  Hydration status: acceptable    Comments: Patient seen and examined postoperatively; vital signs stable; SpO2 greater than or equal to 90%; cardiopulmonary status stable; nausea/vomiting adequately controlled; pain adequately controlled; no apparent anesthesia complications; patient discharged from anesthesia care when discharge criteria were met

## 2024-05-14 NOTE — H&P
[facility]  Pre Procedure History & Physical    Chief Complaint:   Time for my colonoscopy    Subjective     HPI:   73 y.o. male presenting to endoscopy unit today for colonoscopy after positive cologuard.    Past Medical History:   Past Medical History:   Diagnosis Date    Atrial fibrillation     BPH (benign prostatic hypertrophy)     Coarctation of aorta     Original in , had a bleed in  which was repaired and in  where he had stents placed.    Depression 2016    Stopped antidepressant     Erectile dysfunction     Resulting from prostate cancer radiation treatment    History of cardioversion     for A-fib    Hypertension     Primary osteoarthritis of first carpometacarpal joint of left hand 2021    Prostate cancer 2017    completed radiation therapy (43 sessions) on 2017       Family History:  Family History   Problem Relation Age of Onset    Breast cancer Mother          at age 40    Mental illness Mother     Miscarriages / Stillbirths Mother     Cancer Mother     Breast cancer Maternal Aunt     Cancer Maternal Aunt     Cancer Maternal Grandfather     No Known Problems Sister     Lung cancer Other     Other Father         Progressive SupraNuclear Palsey -  2006 at age  76    Other Son         mood disorder    Heart disease Neg Hx        Social History:   reports that he has never smoked. He has never used smokeless tobacco. He reports that he does not currently use alcohol after a past usage of about 1.0 standard drink of alcohol per week. He reports that he does not use drugs.    Medications:   Medications Prior to Admission   Medication Sig Dispense Refill Last Dose    amLODIPine-valsartan (EXFORGE)  MG per tablet Take 1 tablet by mouth Daily.   2024    Eliquis 5 MG tablet tablet Take 1 tablet by mouth 2 (Two) Times a Day.   2024    metoprolol succinate XL (TOPROL-XL) 50 MG 24 hr tablet Take 1 tablet by mouth Daily.  "90 tablet 1 5/14/2024    propafenone (RYTHMOL) 150 MG tablet Take 1 tablet by mouth 2 (Two) Times a Day.   5/14/2024    sildenafil (VIAGRA) 100 MG tablet Take 1 tablet by mouth Daily.   Past Week       Allergies:  Patient has no known allergies.    ROS:    Pertinent items are noted in HPI     Objective     Blood pressure 143/83, pulse 62, temperature 97.7 °F (36.5 °C), temperature source Temporal, resp. rate 20, height 182.9 cm (72\"), weight 82 kg (180 lb 12.8 oz), SpO2 95%.    Physical Exam   Constitutional: Pt is oriented to person, place, and time and well-developed, well-nourished, and in no distress.   Abdominal: Soft.   Psychiatric: Mood, memory, affect and judgment normal.     Assessment & Plan     Diagnosis:  Positive cologuard    Anticipated Surgical Procedure:  Colonoscopy    The risks, benefits, and alternatives of this procedure have been discussed with the patient or the responsible party- the patient understands and agrees to proceed.                                                                  "

## 2024-05-14 NOTE — ANESTHESIA PREPROCEDURE EVALUATION
Anesthesia Evaluation     NPO Solid Status: > 8 hours  NPO Liquid Status: > 8 hours           Airway   Mallampati: I  No difficulty expected  Dental      Pulmonary    Cardiovascular   Exercise tolerance: good (4-7 METS)    (+) hypertension, dysrhythmias Atrial Fib      Neuro/Psych  (+) psychiatric history  GI/Hepatic/Renal/Endo      Musculoskeletal     Abdominal    Substance History      OB/GYN          Other   arthritis,   history of cancer    ROS/Med Hx Other: Afib x 1 - cv x 1 - resolved     Aortic coarctation s/p stent - stable               Anesthesia Plan    ASA 3     MAC     intravenous induction     Anesthetic plan, risks, benefits, and alternatives have been provided, discussed and informed consent has been obtained with: patient.    CODE STATUS:

## 2024-08-16 ENCOUNTER — OFFICE VISIT (OUTPATIENT)
Dept: INTERNAL MEDICINE | Age: 74
End: 2024-08-16
Payer: MEDICARE

## 2024-08-16 VITALS
HEIGHT: 72 IN | HEART RATE: 57 BPM | OXYGEN SATURATION: 96 % | SYSTOLIC BLOOD PRESSURE: 130 MMHG | WEIGHT: 178 LBS | DIASTOLIC BLOOD PRESSURE: 80 MMHG | TEMPERATURE: 97.3 F | BODY MASS INDEX: 24.11 KG/M2

## 2024-08-16 DIAGNOSIS — Z13.6 ENCOUNTER FOR SCREENING FOR VASCULAR DISEASE: ICD-10-CM

## 2024-08-16 DIAGNOSIS — M25.552 CHRONIC LEFT HIP PAIN: ICD-10-CM

## 2024-08-16 DIAGNOSIS — E78.6 LOW HDL (UNDER 40): Chronic | ICD-10-CM

## 2024-08-16 DIAGNOSIS — Q23.1 BICUSPID AORTIC VALVE: Chronic | ICD-10-CM

## 2024-08-16 DIAGNOSIS — I48.0 PAROXYSMAL ATRIAL FIBRILLATION: Chronic | ICD-10-CM

## 2024-08-16 DIAGNOSIS — Z00.00 MEDICARE ANNUAL WELLNESS VISIT, SUBSEQUENT: Primary | ICD-10-CM

## 2024-08-16 DIAGNOSIS — G89.29 CHRONIC LEFT HIP PAIN: ICD-10-CM

## 2024-08-16 DIAGNOSIS — C61 PROSTATE CANCER: Chronic | ICD-10-CM

## 2024-08-16 DIAGNOSIS — I10 PRIMARY HYPERTENSION: Chronic | ICD-10-CM

## 2024-08-16 NOTE — ASSESSMENT & PLAN NOTE
Lab Results   Component Value Date    LDL 93 02/16/2024    LDL 93 04/04/2023    LDL 76 06/07/2022    TRIG 102 02/16/2024    CHOLHDLRATIO 3.7 02/16/2024      Maintain low saturated fat/cholesterol diet.  Check lab on follow-up.

## 2024-08-16 NOTE — PATIENT INSTRUCTIONS
** IMPORTANT MESSAGE FROM DR. MORSE **    In our office, your satisfaction is VERY important to us.     You may receive a survey from [employer] [facility] by mail or E-mail for you to provide feedback about your visit. This information is invaluable for me to know what we can do to improve our services.     I ask that you please take a few minutes to complete the survey and let us know how we are doing in serving your needs. (You may receive the survey more than once for multiple visits)    Thank You !    Dr. Morse    _________________________________________________________________________________________________________________________      ** ADDITIONAL INSTRUCTION / REMINDERS FROM DR. MORSE **    OBTAIN THESE VACCINES AT THE PHARMACY:    COVID-19 booster shot, RSV, Shingrix (shingles), Hepatitis A Series, and Influenza (High Dose)

## 2024-08-16 NOTE — PROGRESS NOTES
I N T E R N A L  M E D I C I N E    J U N O H  K I M,  M D      ENCOUNTER DATE:  08/16/2024    Chapincito Nguyen / 73 y.o. / male      MEDICARE ANNUAL WELLNESS VISIT       Chief Complaint:    Chief Complaint   Patient presents with    Medicare Wellness-subsequent     12/13/22    chronic medical problems         Patient's general assessment of his health since a year ago:     - Compared to one year ago, he feels his physical health is:   STABLE/SAME    - Compared to one year ago, he feels his mental health is:  STABLE/SAME    Recent Hospitalization (within past 365 days) (NO unless indicated)  No      Patient Care Team:    Patient Care Team:  Teddy Bui MD as PCP - General (Internal Medicine)  Chandan Brooke MD as Consulting Physician (Urology)  Self, Db Andrade MD (Vascular Surgery)  Harrison Patel MD as Consulting Physician (Cardiology)    Allergies:  Patient has no known allergies.    Medications:  Current Outpatient Medications on File Prior to Visit   Medication Sig Dispense Refill    amLODIPine-valsartan (EXFORGE)  MG per tablet Take 1 tablet by mouth Daily.      Eliquis 5 MG tablet tablet Take 1 tablet by mouth 2 (Two) Times a Day.      metoprolol succinate XL (TOPROL-XL) 50 MG 24 hr tablet Take 1 tablet by mouth Daily. 90 tablet 1    propafenone (RYTHMOL) 150 MG tablet Take 1 tablet by mouth 2 (Two) Times a Day.      sildenafil (VIAGRA) 100 MG tablet Take 1 tablet by mouth Daily.       No current facility-administered medications on file prior to visit.          No opioid medication identified on active medication list. I have reviewed chart for other potential  high risk medication/s and harmful drug interactions in the elderly.       Opioid Pain Assessment: No opioid listed on medication list       HPI for other active medical problems:     Hypertension continues to remain controlled on metoprolol succinate 50 mg daily.  He is on Eliquis 5 mg twice daily for paroxysmal atrial  fibrillation without complaints of increased palpitations, chest pain or dyspnea on exertion.  Denies any bleeding complications from Eliquis.  He had a false positive Cologuard test and colonoscopy was negative.  No further screening was recommended by GI.  He has history of prostate cancer status post radiation therapy in 2017 and is followed by urologist.  He reports stable PSA.  He takes sildenafil as needed for erectile dysfunction.  He has history of low HDL but does not require any cholesterol medication.  He does complain of chronic left hip pain which tends to bother him but denies severe pain or limitation in function.  He takes Tylenol when needed for pain control.      HISTORY     PFSH:     The following portions of the patient's history were reviewed and updated as appropriate: Allergies / Current Medications / Past Medical History / Surgical History / Social History / Family History    Problem List:  Patient Active Problem List   Diagnosis    Impotence of organic origin    Hypertension    Bicuspid aortic valve    H/O aortic coarctation repair    Status post thoracic aortic aneurysm repair    Prostate cancer    Primary osteoarthritis involving multiple joints    Low HDL (under 40)    Paroxysmal atrial fibrillation    Positive colorectal cancer screening using Cologuard test    Chronic left hip pain       Past Medical History:  Past Medical History:   Diagnosis Date    Atrial fibrillation     BPH (benign prostatic hypertrophy)     Coarctation of aorta     Original in 1987, had a bleed in 1993 which was repaired and in 2006 where he had stents placed.    Depression 04/21/2016    Stopped antidepressant 2007    Erectile dysfunction 2017    Resulting from prostate cancer radiation treatment    History of cardioversion     for A-fib    Hypertension     Primary osteoarthritis of first carpometacarpal joint of left hand 05/25/2021    Prostate cancer 04/2017    completed radiation therapy (43 sessions) on July  2017       Past Surgical History:  Past Surgical History:   Procedure Laterality Date    ADENOIDECTOMY  Childhood    CARDIOVERSION      for a-fib    CAROTID ARTERY - SUBCLAVIAN ARTERY BYPASS GRAFT Left 2006    for endograft leak    COARCTATION OF AORTA EXCISION  1987    open repair    COLONOSCOPY  2017    WNL    COLONOSCOPY W/ POLYPECTOMY N/A 2024    Procedure: COLONOSCOPY WITH BIOPSY/POLYPECTOMY;  Surgeon: Gerardo Ross MD;  Location: [facility];  Service: Gastroenterology;  Laterality: N/A;  hemorrhoids, diverticulosis    CYSTOSCOPY BLADDER STONE LITHOTRIPSY Left 11/15/2023    EYE SURGERY  Early childhood    PSEUDOANEURYSM REPAIR      thoracid pseudoaneurysm repair    THORACIC AORTA STENT  2006    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)    TONSILLECTOMY  Childhood    VASECTOMY         Social History:  Social History     Socioeconomic History    Marital status:      Spouse name: Tracy    Number of children: 3   Tobacco Use    Smoking status: Never    Smokeless tobacco: Never   Vaping Use    Vaping status: Never Used   Substance and Sexual Activity    Alcohol use: Yes     Alcohol/week: 1.0 standard drink of alcohol     Types: 1 Drinks containing 0.5 oz of alcohol per week     Comment: 0.5 per week    Drug use: No    Sexual activity: Yes     Partners: Female     Birth control/protection: Surgical     Comment: vasectomy in        Family History:  Family History   Problem Relation Age of Onset    Breast cancer Mother          at age 40    Mental illness Mother     Miscarriages / Stillbirths Mother     Other (Other) Father         neurologic disorder    No Known Problems Sister     Cancer Maternal Grandfather     No Known Problems Son     Breast cancer Maternal Aunt     Cancer Maternal Aunt     Lung cancer Other     Obesity Daughter     Obesity Daughter     Heart disease Neg Hx          PATIENT ASSESSMENT     Vitals:  Vitals:    24 0940  "  BP: 130/80   Pulse: 57   Temp: 97.3 °F (36.3 °C)   SpO2: 96%   Weight: 80.7 kg (178 lb)   Height: 182.9 cm (72\")       BP Readings from Last 3 Encounters:   08/16/24 130/80   05/14/24 108/71   02/16/24 108/70     Wt Readings from Last 3 Encounters:   08/16/24 80.7 kg (178 lb)   05/14/24 82 kg (180 lb 12.8 oz)   02/16/24 84.4 kg (186 lb)      Body mass index is 24.14 kg/m².    Blood pressure readings recorded on patient flowsheet:       No data to display                    Review of Systems:    Review of Systems  Constitutional neg except per HPI   Resp neg  CV neg   GI negative    negative for change   Neuro Negative for change   MuSk left hip pain     Physical Exam:    Physical Exam  General: No acute distress.   Psych: Normal thought and judgment.   Cardiovascular Rate: normal. Rhythm: regular. Heart sounds: normal.    Pulm/Chest: Effort normal, breath sounds normal.   No lower extremity edema     Reviewed Data:    Labs:   Lab Results   Component Value Date     11/08/2023    K 4.0 11/08/2023    CALCIUM 8.9 11/08/2023    AST 17 04/04/2023    ALT 17 04/04/2023    BUN 14 11/08/2023    CREATININE 0.97 11/08/2023    CREATININE 1.05 04/04/2023    CREATININE 0.83 01/10/2023    EGFRIFNONA 82 12/02/2021    EGFRIFAFRI >60 01/10/2023       Lab Results   Component Value Date    GLU 95 01/10/2023       Lab Results   Component Value Date    LDL 93 02/16/2024    LDL 93 04/04/2023    LDL 76 06/07/2022    HDL 41 02/16/2024    TRIG 102 02/16/2024    CHOLHDLRATIO 3.7 02/16/2024       Lab Results   Component Value Date    TSH 1.040 04/04/2023    FREET4 1.16 04/04/2023          Lab Results   Component Value Date    WBC 4.62 11/08/2023    HGB 14.1 11/08/2023    HGB 15.7 04/04/2023    HGB 15.3 12/14/2022     11/08/2023                   Lab Results   Component Value Date    PSA 5.070 (H) 02/21/2017    PSA 4.470 (H) 05/06/2016    PSA 5.700 (H) 04/21/2016       Imaging:                Medical Tests:              Summary " of old records / correspondence / consultant report:             Request outside records:           SCREENING ASSESSMENT      Screening for Glaucoma:  Previous screening for glaucoma?: Yes    Hearing Loss Screen:  Finger Rub Hearing Test (right ear): Borderline  Finger Rub Hearing Test (left ear): Passed and Failed    Urinary Incontinence Screen:  Episodes of urinary incontinence? : NO    Depression Screen:      8/16/2024     9:45 AM   PHQ-2/PHQ-9 Depression Screening   Little Interest or Pleasure in Doing Things 0-->not at all   Feeling Down, Depressed or Hopeless 0-->not at all   PHQ-9: Brief Depression Severity Measure Score 0        PHQ-2: 0 (Not depressed)    PHQ-9: 0 (Negative screening for depression)         FUNCTIONAL, FALL RISK, & COGNITIVE SCREENING (components below):     A) Functional and cognitive status based on patient responses:        8/14/2024    11:52 AM   Functional & Cognitive Status   Do you have difficulty preparing food and eating? No   Do you have difficulty bathing yourself, getting dressed or grooming yourself? No   Do you have difficulty using the toilet? No   Do you have difficulty moving around from place to place? No   Do you have trouble with steps or getting out of a bed or a chair? No   Current Diet Limited Junk Food   Dental Exam Up to date   Eye Exam Up to date   Exercise (times per week) 3 times per week   Current Exercises Include Swimming;Walking;Yard Work   Do you need help using the phone?  No   Are you deaf or do you have serious difficulty hearing?  No   Do you need help to go to places out of walking distance? No   Do you need help shopping? No   Do you need help preparing meals?  No   Do you need help with housework?  No   Do you need help with laundry? No   Do you need help taking your medications? No   Do you need help managing money? No   Do you ever drive or ride in a car without wearing a seat belt? No   Have you felt unusual stress, anger or loneliness in the last  month? No   Who do you live with? Spouse   If you need help, do you have trouble finding someone available to you? No   Have you been bothered in the last four weeks by sexual problems? No   Do you have difficulty concentrating, remembering or making decisions? No       B) Assessment of Fall Risk:    Fall Risk Assessment was completed, and patient is at low risk for falls.    Need for further evaluation of gait, strength, and balance? : YES    Timed Up and Go (TUG): 8 seconds   (>= 12 seconds indicates high risk for falling)    Observable abnormalities included:   Normal balance and gait pattern  difficulty/slow getting up from sitting  proper shoe wear     C. Assessment of Cognitive Function:    Mini-Cog Test:     1) Registration (3 objects): YES   2) Clock Draw: Passed? : Yes   3) Number of objects recalled: 3 (MA)     Further evaluation required? : No    **OVERALL ASSESSMENT OF FUNCTIONAL ABILITY**  (Assessment of ability to perform ADL's (showering/bathing, using toilet, dressing, feeding self, moving self around) and IADL's (use telephone, shop, prepare food, housekeep, do laundry, transport independently, take medications independently, and handle finances)    DEGREE OF FUNCTIONAL IMPAIRMENT:   MILD (based on assessment noted above)    ABILITY TO LIVE INDEPENDENTLY:   Capable of living independently       COUNSELING       A. Identification of Health Risk Factors:    Risk factors include: cardiovascular risk factors, chronic pain, and poor hearing      B. Age-Appropriate Screening Schedule:  (Refer to the list below for future screening recommendations based on patient's age, sex and/or medical conditions. Orders for these recommended tests are listed in the plan section. The patient has been provided with a written plan)    Health Maintenance Topics  Health Maintenance   Topic Date Due    ZOSTER VACCINE (1 of 2) Never done    COVID-19 Vaccine (8 - 2023-24 season) 03/28/2024    INFLUENZA VACCINE  08/01/2024     ANNUAL WELLNESS VISIT  08/16/2025    TDAP/TD VACCINES (3 - Td or Tdap) 08/08/2030    COLORECTAL CANCER SCREENING  05/14/2034    HEPATITIS C SCREENING  Completed    Pneumococcal Vaccine 65+  Completed       Health Maintenance Topics Due or Over-Due  Health Maintenance Due   Topic Date Due    ZOSTER VACCINE (1 of 2) Never done    COVID-19 Vaccine (8 - 2023-24 season) 03/28/2024    INFLUENZA VACCINE  08/01/2024         C. Advanced Care Planning:    Advance Care Planning   ACP discussion was held with the patient during this visit. Patient has an advance directive (not in EMR), copy requested.       D. Patient Self-Management and Personalized Health Advice:    He has been provided with PERSONALIZED COUNSELING/INFORMATION (AVS educational information) about:     -- optimizing diet/nutrition plans, improving exercise / conditioning, recommended hearing testing, reducing risk for cardiovascular disease (heart, stroke, vascular), reducing risk for cardiac/vascular events with pre-existing disease, fall prevention, and management of chronic pain with focus on minimizing use of narcotic pain medications      He has been recommended for the following PREVENTATIVE SERVICES which has been performed today, will be ordered today or ordered/performed on upcoming follow-up visit:     LIFESTYLE PREVENTATIVE MEASURES  NUTRITION counseling provided, EXERCISE counseling provided, EYE exam for glaucoma screening recommended, CARDIOVASCULAR disease risk reduction counseling performed, FALL RISK assessment / plan of care completed, URINARY incontinence assessment done    CARDIOVASCULAR SCREENING  VASCULAR screening recommended (including AAA screening)    CANCER SCREENING  COLORECTAL cancer: Colonoscopy/Cologuard discussed , PROSTATE CANCER: HISTORY OF PROSTATE CANCER FOLLOWED BY UROLOGIST    MISC SCREENING  DIABETES screening performed (current/reviewed labs/lab ordered)    VACCINATION/IMMUNIZATION  vaccination for INFLUENZA  administered/recommended/discussed , RSV vaccination discussed/recommended, COVID-19 vaccination discussed/recommended, vaccination for SHINGRIX administered/recommended/discussed, vaccination for HEPATITIS A administered/recommended/discussed      E. Miscellaneous Items: [id number]    Aspirin use counseling: Does not need ASA (and currently is not on it)    Discussed BMI with him. The BMI is in the acceptable range    Reviewed use of high risk medication in the elderly: YES    Reviewed for potential of harmful drug interactions in the elderly: YES        WRAP UP       Assessment & Plan:    1) MEDICARE ANNUAL WELLNESS VISIT    2) OTHER MEDICAL CONDITIONS ADDRESSED TODAY:            Problem List Items Addressed This Visit          High    Hypertension (Chronic)    Current Assessment & Plan     BP Readings from Last 3 Encounters:   08/16/24 130/80   05/14/24 108/71   02/16/24 108/70      Continue metoprolol succinate 50 mg daily          Relevant Medications    metoprolol succinate XL (TOPROL-XL) 50 MG 24 hr tablet    amLODIPine-valsartan (EXFORGE)  MG per tablet       Medium    Bicuspid aortic valve (Chronic)    Overview     * sees cardiologist          Current Assessment & Plan     To establish with new cardiologist          Relevant Medications    sildenafil (VIAGRA) 100 MG tablet    metoprolol succinate XL (TOPROL-XL) 50 MG 24 hr tablet    Low HDL (under 40) (Chronic)    Current Assessment & Plan     Lab Results   Component Value Date    LDL 93 02/16/2024    LDL 93 04/04/2023    LDL 76 06/07/2022    TRIG 102 02/16/2024    CHOLHDLRATIO 3.7 02/16/2024      Maintain low saturated fat/cholesterol diet.  Check lab on follow-up.          Paroxysmal atrial fibrillation (Chronic)    Overview     Continue follow-up with cardiology    Continue Eliquis and metoprolol succinate         Relevant Medications    sildenafil (VIAGRA) 100 MG tablet    metoprolol succinate XL (TOPROL-XL) 50 MG 24 hr tablet       Low     Prostate cancer (Chronic)    Overview     Diagnosis May 2017, treated with radiation therapy.    Continue follow-up with urologist          Chronic left hip pain (Chronic)    Current Assessment & Plan     Probably due to arthritis.  Take Tylenol if needed for pain.  Consider physical therapy.  I advised him to make a specific follow-up visit if needed in the future.          Other Visit Diagnoses       Medicare annual wellness visit, subsequent    -  Primary    Encounter for screening for vascular disease        Relevant Orders    Vascular Screening (Bundle) CAR                    No orders of the defined types were placed in this encounter.      Discussion / Summary:        Medications as of TODAY:              Current Outpatient Medications   Medication Sig Dispense Refill    amLODIPine-valsartan (EXFORGE)  MG per tablet Take 1 tablet by mouth Daily.      Eliquis 5 MG tablet tablet Take 1 tablet by mouth 2 (Two) Times a Day.      metoprolol succinate XL (TOPROL-XL) 50 MG 24 hr tablet Take 1 tablet by mouth Daily. 90 tablet 1    propafenone (RYTHMOL) 150 MG tablet Take 1 tablet by mouth 2 (Two) Times a Day.      sildenafil (VIAGRA) 100 MG tablet Take 1 tablet by mouth Daily.       No current facility-administered medications for this visit.         FOLLOW-UP:            Return in about 6 months (around 2/16/2025) for Reassess chronic medical problems, COME FASTING FOR LABS.              Future Appointments   Date Time Provider Department Center   2/18/2025  9:15 AM Teddy Bui MD [facility]  [facility]           After Visit Summary (AVS) including the Personalized Prevention  Plan Services (PPPS) was either printed and given to the patient at check-out today and/or sent to [facility] for review.

## 2024-08-16 NOTE — ASSESSMENT & PLAN NOTE
BP Readings from Last 3 Encounters:   08/16/24 130/80   05/14/24 108/71   02/16/24 108/70      Continue metoprolol succinate 50 mg daily

## 2024-08-16 NOTE — ASSESSMENT & PLAN NOTE
Probably due to arthritis.  Take Tylenol if needed for pain.  Consider physical therapy.  I advised him to make a specific follow-up visit if needed in the future.

## 2024-08-16 NOTE — TELEPHONE ENCOUNTER
Caller: PatrickChapincito    Relationship: Self    Best call back number: [phone]    Requested Prescriptions:   Requested Prescriptions     Pending Prescriptions Disp Refills    Eliquis 5 MG tablet tablet 60 tablet      Sig: Take 1 tablet by mouth 2 (Two) Times a Day.        Pharmacy where request should be sent: [facility] - [address] [phone] [facility] [phone] [facility]     Last office visit with prescribing clinician: 8/16/2024   Last telemedicine visit with prescribing clinician: Visit date not found   Next office visit with prescribing clinician: 2/18/2025     Additional details provided by patient: PATIENT IS COMPLETELY OUT. PATIENT IS NEEDING REFILL. PLEASE ADVISE    Does the patient have less than a 3 day supply:  [x] Yes  [] No    Would you like a call back once the refill request has been completed: [] Yes [x] No    If the office needs to give you a call back, can they leave a voicemail: [] Yes [x] No    Sunday Graham   08/16/24 12:43 EDT

## 2024-08-19 RX ORDER — APIXABAN 5 MG/1
5 TABLET, FILM COATED ORAL 2 TIMES DAILY
Qty: 60 TABLET | OUTPATIENT
Start: 2024-08-19

## 2024-10-08 ENCOUNTER — HOSPITAL ENCOUNTER (OUTPATIENT)
Dept: CARDIOLOGY | Facility: HOSPITAL | Age: 74
Discharge: HOME | End: 2024-10-08

## 2024-10-08 DIAGNOSIS — Z13.6 ENCOUNTER FOR SCREENING FOR VASCULAR DISEASE: ICD-10-CM

## 2024-10-08 LAB
BH CV VAS SCREENING CAROTID CCA LEFT: 83.2 CM/SEC
BH CV VAS SCREENING CAROTID CCA RIGHT: 77.7 CM/SEC
BH CV VAS SCREENING CAROTID ICA LEFT: 69.6 CM/SEC
BH CV VAS SCREENING CAROTID ICA RIGHT: 52.8 CM/SEC
BH CV XLRA MEAS - MID AO DIAM: 1.57 CM
BH CV XLRA MEAS - PAD LEFT ABI PT: 1.19
BH CV XLRA MEAS - PAD LEFT ARM: 130 MMHG
BH CV XLRA MEAS - PAD LEFT LEG PT: 160 MMHG
BH CV XLRA MEAS - PAD RIGHT ABI PT: 1.1
BH CV XLRA MEAS - PAD RIGHT ARM: 135 MMHG
BH CV XLRA MEAS - PAD RIGHT LEG PT: 149 MMHG
BH CV XLRA MEAS LEFT DIST CCA EDV: -20.5 CM/SEC
BH CV XLRA MEAS LEFT DIST CCA PSV: -83.2 CM/SEC
BH CV XLRA MEAS LEFT ICA/CCA RATIO: 0.84
BH CV XLRA MEAS LEFT PROX ICA EDV: -17.4 CM/SEC
BH CV XLRA MEAS LEFT PROX ICA PSV: -69.6 CM/SEC
BH CV XLRA MEAS RIGHT DIST CCA EDV: -11.2 CM/SEC
BH CV XLRA MEAS RIGHT DIST CCA PSV: -77.7 CM/SEC
BH CV XLRA MEAS RIGHT ICA/CCA RATIO: 0.68
BH CV XLRA MEAS RIGHT PROX ICA EDV: -13 CM/SEC
BH CV XLRA MEAS RIGHT PROX ICA PSV: -52.8 CM/SEC

## 2024-10-08 PROCEDURE — 93799 UNLISTED CV SVC/PROCEDURE: CPT

## 2025-04-22 ENCOUNTER — OFFICE VISIT (OUTPATIENT)
Dept: INTERNAL MEDICINE | Age: 75
End: 2025-04-22
Payer: MEDICARE

## 2025-04-22 VITALS
HEIGHT: 72 IN | OXYGEN SATURATION: 98 % | TEMPERATURE: 96.9 F | DIASTOLIC BLOOD PRESSURE: 78 MMHG | WEIGHT: 180 LBS | HEART RATE: 73 BPM | BODY MASS INDEX: 24.38 KG/M2 | SYSTOLIC BLOOD PRESSURE: 136 MMHG

## 2025-04-22 DIAGNOSIS — G89.29 CHRONIC LEFT HIP PAIN: Chronic | ICD-10-CM

## 2025-04-22 DIAGNOSIS — I10 PRIMARY HYPERTENSION: Primary | Chronic | ICD-10-CM

## 2025-04-22 DIAGNOSIS — G89.29 CHRONIC LEFT-SIDED LOW BACK PAIN, UNSPECIFIED WHETHER SCIATICA PRESENT: ICD-10-CM

## 2025-04-22 DIAGNOSIS — I48.0 PAROXYSMAL ATRIAL FIBRILLATION: Chronic | ICD-10-CM

## 2025-04-22 DIAGNOSIS — M25.552 CHRONIC LEFT HIP PAIN: Chronic | ICD-10-CM

## 2025-04-22 DIAGNOSIS — M54.50 CHRONIC LEFT-SIDED LOW BACK PAIN, UNSPECIFIED WHETHER SCIATICA PRESENT: ICD-10-CM

## 2025-04-22 NOTE — ASSESSMENT & PLAN NOTE
Check x-ray of the left hip and lumbar spine  Tylenol as needed for pain  Modify physical activity and posture

## 2025-04-22 NOTE — PROGRESS NOTES
J  U  N  O  H    K  I  M ,   M  D                               I  N  T  E  R  N  A  L    M  E  D  I  C  I  N  E         ENCOUNTER DATE:  04/22/2025    Chapincito Nguyen / 74 y.o. / male    OFFICE VISIT ENCOUNTER       CHIEF COMPLAINT / REASON FOR OFFICE VISIT     Hypertension, Paroxysmal atrial fibrillation , Prostate Cancer, Hip Pain (Left pain , for a while , got worse recently , hip pain if he lay on the left side at night), and Shoulder Pain (Left , after doing some yard work)      ASSESSMENT & PLAN     Problem List Items Addressed This Visit          High    Hypertension - Primary (Chronic)    Overview   Continue metoprolol XL 50 mg and amlodipine-valsartan  mg daily         Relevant Medications    metoprolol succinate XL (TOPROL-XL) 50 MG 24 hr tablet    amLODIPine-valsartan (EXFORGE)  MG per tablet    Other Relevant Orders    Comprehensive Metabolic Panel       Medium    Paroxysmal atrial fibrillation (Chronic)    Overview   Continue follow-up with cardiology    Continue propafenone, Eliquis and metoprolol succinate         Relevant Medications    sildenafil (VIAGRA) 100 MG tablet    metoprolol succinate XL (TOPROL-XL) 50 MG 24 hr tablet    Other Relevant Orders    Comprehensive Metabolic Panel    CBC & Differential    Chronic left hip pain (Chronic)    Current Assessment & Plan   Check x-ray of the left hip and lumbar spine  Tylenol as needed for pain  Modify physical activity and posture         Relevant Orders    XR hip w or wo pelvis 2-3 view left     Other Visit Diagnoses         Chronic left-sided low back pain, unspecified whether sciatica present        Relevant Orders    XR Spine Lumbar Complete 4+VW          Orders Placed This Encounter   Procedures    XR Spine Lumbar Complete 4+VW     Standing Status:   Future     Expected Date:   4/22/2025     Expiration Date:   7/22/2026     Reason for Exam::   low back pain     Release to patient:   Routine  Release [[id number]]    XR hip w or wo pelvis 2-3 view left     Standing Status:   Future     Expected Date:   4/22/2025     Expiration Date:   7/22/2026     Reason for Exam::   left hip / back pain     Release to patient:   Routine Release [[id number]]    Comprehensive Metabolic Panel     Standing Status:   Future     Expected Date:   4/22/2025     Expiration Date:   7/22/2026     Release to patient:   Routine Release [[id number]]    CBC & Differential     Standing Status:   Future     Expected Date:   4/22/2025     Expiration Date:   7/22/2026     Manual Differential:   No     Release to patient:   Routine Release [[id number]]     No orders of the defined types were placed in this encounter.      SUMMARY/DISCUSSION  Assessment & Plan  Left hip pain.  - Possible referred pain from the back. Advised to maintain proper posture and avoid overexertion.  - Ordered x-ray of left hip and lumbar spine.  - Results will be communicated via phone or MyChart.  - Tylenol Extra Strength effective for pain management.    Premature atrial contractions.  - Holter monitor: Normal rhythm, rate 46-95 bpm, numerous PACs, no atrial fibrillation or heart block.  - Echocardiogram: Normal ejection fraction, mild to moderate mitral regurgitation, normal aortic valve.  - Current medications: metoprolol 50 mg daily, amlodipine, valsartan, Eliquis, propafenone. No changes needed.    Health maintenance.  - Stable weight, acceptable blood pressure.  - Advised to consider RSV, Shingrix, and hepatitis A vaccines. Continue annual COVID-19 vaccine.  - Ordered chemistry panel to assess blood sugar, kidney function, liver function, electrolytes, and complete blood counts. Cholesterol levels checked biennially.  - Last blood work: 02/2024. Last kidney and liver function tests: 11/2023.    Follow-up  - Scheduled for follow-up visit in 6 months for Medicare wellness check.         TOTAL TIME OF ENCOUNTER:        Next Appointment with me: Visit date not  "found     Return in about 6 months (around 10/22/2025) for **SCHEDULE COMBINED AWV AND MEDICAL F/U**.      VITAL SIGNS     Vitals:    04/22/25 1407   BP: 136/78   Pulse: 73   Temp: 96.9 °F (36.1 °C)   SpO2: 98%   Weight: 81.6 kg (180 lb)   Height: 182.9 cm (72\")       BP Readings from Last 3 Encounters:   04/22/25 136/78   08/16/24 130/80   05/14/24 108/71     Wt Readings from Last 3 Encounters:   04/22/25 81.6 kg (180 lb)   08/16/24 80.7 kg (178 lb)   05/14/24 82 kg (180 lb 12.8 oz)     Body mass index is 24.41 kg/m².    Blood pressure readings recorded on patient flowsheet:       No data to display                  MEDICATIONS AT THE TIME OF OFFICE VISIT     Current Outpatient Medications on File Prior to Visit   Medication Sig    amLODIPine-valsartan (EXFORGE)  MG per tablet Take 1 tablet by mouth Daily.    Eliquis 5 MG tablet tablet Take 1 tablet by mouth 2 (Two) Times a Day.    metoprolol succinate XL (TOPROL-XL) 50 MG 24 hr tablet Take 1 tablet by mouth Daily.    propafenone (RYTHMOL) 150 MG tablet Take 1 tablet by mouth 2 (Two) Times a Day.    sildenafil (VIAGRA) 100 MG tablet Take 1 tablet by mouth Daily.     No current facility-administered medications on file prior to visit.          HISTORY OF PRESENT ILLNESS     History of Present Illness  The patient came in for evaluation of left hip pain, heart issues, and general health maintenance.    The patient reports that his left hip has been bothering him, especially at night when he sleeps on that side. The pain feels like it's right in the joint and doesn't spread to other areas. This has been going on for at least 6 months and sometimes wakes him up at night. He also mentioned having some back pain, which he thinks might be due to aging. He doesn't have any numbness, foot drop, or trouble walking. To manage the pain, he changes sleeping positions and takes extra strength Tylenol, which helps. He avoids Aleve, Advil, and ibuprofen because he is on " "blood thinners.    Regarding his heart, the patient recently switched to a new cardiologist after his previous one retired last summer. He had a Holter monitor and echocardiogram done around March 20, 2025, which showed normal heart function. However, two appointments to discuss these results were canceled, and a new appointment is set for June 1, 2025. His medications remain the same: metoprolol 50 mg daily, amlodipine-valsartan, Eliquis, and propafenone.    The patient also mentioned an upcoming appointment with his urologist, with the frequency of visits now reduced to once a year instead of every six months. He plans to get the Shingrix vaccine and has been getting the COVID-19 vaccine annually without any issues.    Additionally, the patient reported occasional discomfort in his left shoulder after doing yard work last week.        Patient Care Team:  Teddy Bui MD as PCP - General (Internal Medicine)  Chandan Brooke MD as Consulting Physician (Urology)  Self, Db Andrade MD (Vascular Surgery)  Harrison Patel MD as Consulting Physician (Cardiology)    REVIEW OF SYSTEMS     Review of Systems       PHYSICAL EXAMINATION     Physical Exam  Alert with normal thought and judgment.   Cardiovascular: Normal rate, regular rhythm.   Pulm/Chest: Effort normal, breath sounds normal.   Back: no significant tenderness to palpation of the lower back , no gross deformity of the spine  Hip: mild tenderness over the lateral and anterior hip region ; no tenderness to palpation over the greater trochanter region   Gait is normal       REVIEWED DATA     Labs:     Lab Results   Component Value Date     11/08/2023    K 4.0 11/08/2023    CALCIUM 8.9 11/08/2023    AST 17 04/04/2023    ALT 17 04/04/2023    BUN 14 11/08/2023    CREATININE 0.97 11/08/2023    CREATININE 1.05 04/04/2023    CREATININE 0.83 01/10/2023    EGFR 82.9 11/08/2023   No results found for: \"HGBA1C\"No results found for: \"MALBCRERATIO\"  Lab " Results   Component Value Date    LDL 93 02/16/2024    LDL 93 04/04/2023    LDL 76 06/07/2022    HDL 41 02/16/2024    HDL 41 04/04/2023    TRIG 102 02/16/2024    TRIG 101 04/04/2023     Lab Results   Component Value Date    TSH 1.040 04/04/2023    TSH 1.540 02/18/2020    FREET4 1.16 04/04/2023    FREET4 1.09 02/18/2020     Lab Results   Component Value Date    WBC 4.62 11/08/2023    HGB 14.1 11/08/2023     11/08/2023           Imaging:               Medical Tests:               Summary of old records / correspondence / consultant report:             Request outside records:

## 2025-04-28 ENCOUNTER — HOSPITAL ENCOUNTER (OUTPATIENT)
Dept: GENERAL RADIOLOGY | Facility: HOSPITAL | Age: 75
Discharge: HOME | End: 2025-04-28
Payer: MEDICARE

## 2025-04-28 ENCOUNTER — LAB (OUTPATIENT)
Dept: LAB | Facility: HOSPITAL | Age: 75
End: 2025-04-28
Payer: MEDICARE

## 2025-04-28 ENCOUNTER — TELEPHONE (OUTPATIENT)
Dept: INTERNAL MEDICINE | Age: 75
End: 2025-04-28
Payer: MEDICARE

## 2025-04-28 DIAGNOSIS — M25.512 LEFT SHOULDER PAIN, UNSPECIFIED CHRONICITY: ICD-10-CM

## 2025-04-28 DIAGNOSIS — G89.29 CHRONIC LEFT HIP PAIN: Chronic | ICD-10-CM

## 2025-04-28 DIAGNOSIS — M25.512 LEFT SHOULDER PAIN, UNSPECIFIED CHRONICITY: Primary | ICD-10-CM

## 2025-04-28 DIAGNOSIS — G89.29 CHRONIC LEFT-SIDED LOW BACK PAIN, UNSPECIFIED WHETHER SCIATICA PRESENT: ICD-10-CM

## 2025-04-28 DIAGNOSIS — M25.552 CHRONIC LEFT HIP PAIN: Chronic | ICD-10-CM

## 2025-04-28 DIAGNOSIS — M54.50 CHRONIC LEFT-SIDED LOW BACK PAIN, UNSPECIFIED WHETHER SCIATICA PRESENT: ICD-10-CM

## 2025-04-28 PROCEDURE — 85025 COMPLETE CBC W/AUTO DIFF WBC: CPT | Performed by: INTERNAL MEDICINE

## 2025-04-28 PROCEDURE — 73030 X-RAY EXAM OF SHOULDER: CPT

## 2025-04-28 PROCEDURE — 73502 X-RAY EXAM HIP UNI 2-3 VIEWS: CPT

## 2025-04-28 PROCEDURE — 72110 X-RAY EXAM L-2 SPINE 4/>VWS: CPT

## 2025-04-28 PROCEDURE — 80053 COMPREHEN METABOLIC PANEL: CPT | Performed by: INTERNAL MEDICINE

## 2025-04-28 NOTE — TELEPHONE ENCOUNTER
Caller: Chapincito Nguyen    Relationship: Self    Best call back number: [phone]     What orders are you requesting (i.e. lab or imaging): IMAGING    In what timeframe would the patient need to come in: TODAY    Where will you receive your lab/imaging services: [facility]    Additional notes: PATIENT IS GOING TODAY FOR XRAY FOR HIP AND BACK. HE IS WANTING TO SEE IF DR MORSE COULD ADD AN XRAY OF THE LEFT SHOULDER. THE PAIN HIS GETTING WORSE. HE WOULD LIKE TO BE ABLE TO GO AROUND 10 AM TODAY, IF POSSIBLE. PLEASE CALL TO LET PATIENT KNOW WHEN ORDER IS POSTED

## (undated) DEVICE — ADAPT CLN SCPE ENDO PORPOISE BX/50 DISP

## (undated) DEVICE — Device

## (undated) DEVICE — FLEX ADVANTAGE 1500CC: Brand: FLEX ADVANTAGE

## (undated) DEVICE — CANN O2 ETCO2 FITS ALL CONN CO2 SMPL A/ 7IN DISP LF

## (undated) DEVICE — GOWN SURG ENDOARMOR LVL3 UNIV KNT/CUF DISP NS

## (undated) DEVICE — GOWN ISOL W/THUMB UNIV BLU BX/15

## (undated) DEVICE — GAUZE,SPONGE,FLUFF,6"X6.75",STRL,5/TRAY: Brand: MEDLINE

## (undated) DEVICE — KT ORCA ORCAPOD DISP STRL